# Patient Record
Sex: MALE | Race: ASIAN | NOT HISPANIC OR LATINO
[De-identification: names, ages, dates, MRNs, and addresses within clinical notes are randomized per-mention and may not be internally consistent; named-entity substitution may affect disease eponyms.]

---

## 2018-11-06 ENCOUNTER — RESULT REVIEW (OUTPATIENT)
Age: 48
End: 2018-11-06

## 2021-09-08 PROBLEM — Z00.00 ENCOUNTER FOR PREVENTIVE HEALTH EXAMINATION: Status: ACTIVE | Noted: 2021-09-08

## 2021-11-16 ENCOUNTER — APPOINTMENT (OUTPATIENT)
Dept: NEUROLOGY | Facility: CLINIC | Age: 51
End: 2021-11-16

## 2021-12-07 ENCOUNTER — APPOINTMENT (OUTPATIENT)
Dept: NEUROLOGY | Facility: CLINIC | Age: 51
End: 2021-12-07
Payer: MEDICAID

## 2021-12-07 VITALS
DIASTOLIC BLOOD PRESSURE: 72 MMHG | BODY MASS INDEX: 26.98 KG/M2 | WEIGHT: 158 LBS | HEIGHT: 64 IN | SYSTOLIC BLOOD PRESSURE: 113 MMHG | HEART RATE: 85 BPM

## 2021-12-07 PROCEDURE — 99204 OFFICE O/P NEW MOD 45 MIN: CPT

## 2021-12-07 RX ORDER — FINASTERIDE 5 MG/1
5 TABLET, FILM COATED ORAL
Refills: 0 | Status: ACTIVE | COMMUNITY

## 2021-12-07 RX ORDER — CARBIDOPA AND LEVODOPA 25; 100 MG/1; MG/1
25-100 TABLET ORAL
Refills: 0 | Status: DISCONTINUED | COMMUNITY
End: 2021-12-07

## 2021-12-07 RX ORDER — PANTOPRAZOLE 40 MG/1
40 TABLET, DELAYED RELEASE ORAL
Refills: 0 | Status: ACTIVE | COMMUNITY

## 2021-12-07 NOTE — HISTORY OF PRESENT ILLNESS
[FreeTextEntry1] : Patient is seeing me for evaluation for PD\par \par His initial symptoms started in 2019n with a tremor in his right hand and leg. It was associated with stiffness and slowing of his movements, which have continued to worsen. His right armswing is absent per his wife. He is able to do ADLs independently. He has difficulty taking his wallet out of his pocket and has micrographia as well. He was diagnosed with PD in [facility] where he was started on levodopa. He reports an initial response but more recently has a delayed ON time. \par \par He is  taking sinemet  2 tabs 9-1-5 and 1 tab at 9P. It takes 30minutes for his dose to kick in and it last 4hrs. Afterwards, his off periods are rapid. He has days when he is wearing offs are minimal. During the night, he experiences difficulty turning in bed, but moves well in the morning\par \par MRI brain 7/2021 - (report) normal \par \par Nonmotor:\par severe sleep fragmentation ( 3-4hr/night)\par +constipation - BM every other day\par No cognitive faculties\par \par \par \par Meds\par sinemet \par finasteride 5mg \par protonix

## 2021-12-07 NOTE — PHYSICAL EXAM
[Motor Strength] : muscle strength was normal in all four extremities [2+] : Patella left 2+ [FreeTextEntry1] : Last LD dose 1.5 hr\par \par There is 1+ masking. Speech is 2+. Tremor is absent. There is 1+ R>L bradykinesia with 1+ rigidity on right side. There is no dysmetria. He arises easily and walks with depressed right armswing. SL and turns are normal. Posture is good. Postural reflexes intact

## 2021-12-20 RX ORDER — RASAGILINE 1 MG/1
1 TABLET ORAL
Qty: 30 | Refills: 4 | Status: DISCONTINUED | COMMUNITY
Start: 2021-12-07 | End: 2021-12-20

## 2022-04-26 ENCOUNTER — APPOINTMENT (OUTPATIENT)
Dept: NEUROLOGY | Facility: CLINIC | Age: 52
End: 2022-04-26
Payer: MEDICAID

## 2022-04-26 VITALS
SYSTOLIC BLOOD PRESSURE: 128 MMHG | RESPIRATION RATE: 16 BRPM | WEIGHT: 158 LBS | DIASTOLIC BLOOD PRESSURE: 76 MMHG | HEART RATE: 77 BPM | BODY MASS INDEX: 26.98 KG/M2 | HEIGHT: 64 IN

## 2022-04-26 DIAGNOSIS — R42 DIZZINESS AND GIDDINESS: ICD-10-CM

## 2022-04-26 PROCEDURE — 99214 OFFICE O/P EST MOD 30 MIN: CPT

## 2022-04-26 RX ORDER — SELEGILINE HYDROCHLORIDE 5 MG/1
5 TABLET ORAL
Qty: 180 | Refills: 3 | Status: COMPLETED | COMMUNITY
Start: 2021-12-20 | End: 2022-04-26

## 2022-04-26 NOTE — DISCUSSION/SUMMARY
[FreeTextEntry1] : PD x 4 years with vertiginous dizziness that is likely peripheral in etiology\par \par Patient was counseled on the following recommendations:\par \par change sinemet to 1 tab q3hrs (4 doses/d)\par cont CR  1 tab qhs\par obtain brain MRI to r/o recent vascular insult \par refer to vestibular therapy\par increase melatonin to 5-6mg qhs\par \par \par f/u 3-4 months

## 2022-04-26 NOTE — PHYSICAL EXAM
[FreeTextEntry1] : Last dose 3.5hrs\par Not orthostatic on exam\par \par There is mild masking. Tremor is absent. There is 2+ R>L bradykinesia with mild cogwheeling. Walks with right foot dystonia and depressed armswing. Pull test is negative. Turned to left on fukuda

## 2022-04-26 NOTE — HISTORY OF PRESENT ILLNESS
[FreeTextEntry1] : With initiation of MABOI patient reports he developed lightheadedness that improved following discontinuation. In the past month, he complains of dizziness when standing and walking. He qualifies the feeling as a sensation of moving in space. He does not syncopize and it abates when he sits still. \par He is taking sinemet 1.5 tabs TID and feels that the vertiginious dizziness worsens after each dose. He egst close to 3.5-4hrs of ON time. Wearing off is heralded by numbness and heaviness on his right side.\par \par \par MRI brain 7/2021 - (report) normal \par \par Nonmotor:\par severe sleep fragmentation ( 3-4hr/night) - melatonin 3mg qhs has modest benefits\par +constipation - BM every other day\par No cognitive faculties\par exercising more\par \par \par \par Meds\par sinemet  1.5 TID ( 10-2-6)\par sinemet ER  1 tab qhs\par finasteride 5mg \par protonix

## 2022-05-14 ENCOUNTER — APPOINTMENT (OUTPATIENT)
Dept: MRI IMAGING | Facility: CLINIC | Age: 52
End: 2022-05-14
Payer: MEDICAID

## 2022-05-14 ENCOUNTER — OUTPATIENT (OUTPATIENT)
Dept: OUTPATIENT SERVICES | Facility: HOSPITAL | Age: 52
LOS: 1 days | End: 2022-05-14
Payer: MEDICAID

## 2022-05-14 DIAGNOSIS — R42 DIZZINESS AND GIDDINESS: ICD-10-CM

## 2022-05-14 PROCEDURE — 70551 MRI BRAIN STEM W/O DYE: CPT

## 2022-05-14 PROCEDURE — 70551 MRI BRAIN STEM W/O DYE: CPT | Mod: 26

## 2022-07-26 ENCOUNTER — APPOINTMENT (OUTPATIENT)
Dept: NEUROLOGY | Facility: CLINIC | Age: 52
End: 2022-07-26

## 2022-07-26 VITALS
HEIGHT: 64 IN | DIASTOLIC BLOOD PRESSURE: 70 MMHG | SYSTOLIC BLOOD PRESSURE: 111 MMHG | WEIGHT: 160 LBS | HEART RATE: 79 BPM | BODY MASS INDEX: 27.31 KG/M2

## 2022-07-26 DIAGNOSIS — K59.00 CONSTIPATION, UNSPECIFIED: ICD-10-CM

## 2022-07-26 PROCEDURE — 99214 OFFICE O/P EST MOD 30 MIN: CPT

## 2022-07-26 RX ORDER — POLYETHYLENE GLYCOL 3350 17 G/17G
17 POWDER, FOR SOLUTION ORAL DAILY
Qty: 1 | Refills: 6 | Status: ACTIVE | COMMUNITY
Start: 2022-07-26 | End: 1900-01-01

## 2022-07-26 NOTE — DISCUSSION/SUMMARY
[FreeTextEntry1] : PD x4-5 years with good motoric improvement. Suspect possible delayed LD absorption in the later afternoon. \par Constipation\par \par Patient was counseled on the following recommendations:\par \par Leave LD regimen the same\par start miralax qd-qod \par continue regular exercising\par use antivert 12.5mg TID prn for positional vertigo\par \par f/u 4months

## 2022-07-26 NOTE — HISTORY OF PRESENT ILLNESS
[FreeTextEntry1] : Patient kelley been doing better since his last visit. He slowly raised his sinemet to 2 tabs TID\par Overnight akinesia has improved\par In the late afternoon, his 5PM dose can take 2-3 hrs to kick in. \par Denies any falls\par There are no hand tremors or LID\par Exercises regularly\par vertigo responsive to antivert prn\par \par \par MRI brain 7/2021 - (report) normal \par \par Nonmotor:\par sleep has improved\par +constipation - BM every other day\par No cognitive faculties\par exercising more\par \par Meds\par sinemet  2 tabs 9A, 1p, 5pm\par sinemet ER  1.5 tab qhs\par finasteride 5mg \par protonix \par \par Prior \par selegiline

## 2022-07-26 NOTE — PHYSICAL EXAM
[FreeTextEntry1] : There is mild masking. Tremor is absent. There is 1+ R>L bradykinesia with trace cogwheeling. Gait is normal with slight asymmetric right armswing. pull test negative

## 2022-10-04 ENCOUNTER — APPOINTMENT (OUTPATIENT)
Dept: NEUROLOGY | Facility: CLINIC | Age: 52
End: 2022-10-04

## 2022-10-04 VITALS
BODY MASS INDEX: 27.31 KG/M2 | DIASTOLIC BLOOD PRESSURE: 74 MMHG | HEIGHT: 64 IN | WEIGHT: 160 LBS | HEART RATE: 73 BPM | RESPIRATION RATE: 16 BRPM | SYSTOLIC BLOOD PRESSURE: 110 MMHG

## 2022-10-04 DIAGNOSIS — F51.04 PSYCHOPHYSIOLOGIC INSOMNIA: ICD-10-CM

## 2022-10-04 PROCEDURE — 99214 OFFICE O/P EST MOD 30 MIN: CPT

## 2022-10-04 RX ORDER — CARBIDOPA AND LEVODOPA 25; 100 MG/1; MG/1
25-100 TABLET, EXTENDED RELEASE ORAL
Qty: 135 | Refills: 3 | Status: COMPLETED | COMMUNITY
Start: 2021-12-07 | End: 2022-10-04

## 2022-10-04 RX ORDER — MECLIZINE HYDROCHLORIDE 12.5 MG/1
12.5 TABLET ORAL
Qty: 45 | Refills: 0 | Status: COMPLETED | COMMUNITY
Start: 2022-05-17 | End: 2022-10-04

## 2022-10-04 NOTE — PHYSICAL EXAM
[FreeTextEntry1] : Last LD was 3.5hrs\par \par There is mild masking. Tremor is absent. There is 2+ R>L bradykinesia with trace cogwheeling. Gait is mildl slowly with dystonic posturing of right leg with ea stride. Pull test negative. No FOG or LID

## 2022-10-04 NOTE — HISTORY OF PRESENT ILLNESS
[FreeTextEntry1] : Patient's sister states that his 5pm dose of levodopa does not take effect robustly. he experiences dizziness, tremor and increased slowness.\par The prior doses tend to work reliably. \par sinemet ER was not effective and prefers to take regular sinemet \par Often awakens overnight with headache and restlessness\par \par MRI brain 7/2021 - (report) normal \par \par Nonmotor:\par sleep has worsened and getting 3-4 hrs at night \par +constipation - BM every other day. Uses miralax prn \par No cognitive faculties\par exercises sometimes \par experiences occasional depression\par \par Meds\par sinemet  2 tabs 9A, 1p, 5pm, 1.5 tabs at bedtime\par melatonin 5mg qhs\par finasteride 5mg \par protonix \par \par Prior \par selegiline

## 2022-10-04 NOTE — DISCUSSION/SUMMARY
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

## 2023-01-05 ENCOUNTER — APPOINTMENT (OUTPATIENT)
Dept: NEUROLOGY | Facility: CLINIC | Age: 53
End: 2023-01-05
Payer: MEDICAID

## 2023-01-05 VITALS
HEIGHT: 64 IN | DIASTOLIC BLOOD PRESSURE: 69 MMHG | SYSTOLIC BLOOD PRESSURE: 115 MMHG | WEIGHT: 163 LBS | HEART RATE: 73 BPM | BODY MASS INDEX: 27.83 KG/M2

## 2023-01-05 PROCEDURE — 99214 OFFICE O/P EST MOD 30 MIN: CPT

## 2023-01-05 NOTE — DISCUSSION/SUMMARY
[FreeTextEntry1] : PD x 3 years with varied wearing off and dose failures. He tends to have lots og GI discomfort with gas and abdominal pain at times associated with low appetite. His motor fluctuations are likely in the setting of his GI dysmotility, potentially SIBO (small intestinal bacterial overgrowth). He also takes his medication after he eats which can cause malabsorption with protein. 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 2pills at 7am, 11am, 3pm, and 1.5pills at 7pm.\par - eat 30-60 minutes after taking the medication\par - Trial of neupro 2mg patch to avoid the GI tract\par - If insurance not covered can try pramipexole\par - c/w trazodone 50mg 1/2-1 tab qhs\par - increase exercises during the week\par - start probiotics for GI health/motility. Can consider GI consult for constipation, perhaps linzes?\par - start senna along with already daily miralax \par \par Case discussed and pt examined with movement attending Dr. Jones \par \par Magan Wesley MD\par Movement Fellow

## 2023-01-05 NOTE — HISTORY OF PRESENT ILLNESS
[FreeTextEntry1] : Since last visit in October 2022 he reports still having issues of fluctuations. Sometimes having dose failures. Right side feeling weaker and slower. \par - walking is worse with balance when he feels OFF. no falls. \par - started entacapone and was working well without fluctuations and after 5 days slowly worsened and now his sx's the same as last visit. \par - report's his head hurts after taking the medication with varied times. \par - he rarely feels weak and dizzy and BP checked 90/65. This has occurred so far 3 times. No syncopal episodes.\par - Lots of gas, BM every other day to 2 days. milk of magnesia PRN. uses MiraLAX.. stomach pain at night. low appetite. eats small portions of food including vegetables. \par - memory is ok. talks slow. some apathy \par - Is not physically active because he is not well and concerned about falling.Car driving has become more of an issue. \par \par sleep has worsened and getting 3-4 hrs at night. started trazodone which sometimes helps. \par \par Meds\par sinemet  2 tabs 9A, 1p, 5pm, and 1.5 tabs at 9pm . Eats meals and then takes meds... \par melatonin 5mg qhs PRN \par finasteride 5mg\par protonix\par \par Prior \par selegiline. rasagiline not covered by insurance

## 2023-03-31 ENCOUNTER — APPOINTMENT (OUTPATIENT)
Dept: NEUROLOGY | Facility: CLINIC | Age: 53
End: 2023-03-31
Payer: MEDICAID

## 2023-03-31 VITALS
DIASTOLIC BLOOD PRESSURE: 71 MMHG | HEIGHT: 64 IN | BODY MASS INDEX: 25.95 KG/M2 | SYSTOLIC BLOOD PRESSURE: 109 MMHG | HEART RATE: 72 BPM | WEIGHT: 152 LBS

## 2023-03-31 PROCEDURE — 99214 OFFICE O/P EST MOD 30 MIN: CPT

## 2023-03-31 NOTE — PHYSICAL EXAM
[FreeTextEntry1] : Last LD was 2.5hrs. he feels ON\par \par There is mild masking. Tremor is absent. There is 1+ R>L bradykinesia with trace cogwheeling. Walks with good speed and SL.  Pull test negative. No FOG.LID absent

## 2023-03-31 NOTE — DISCUSSION/SUMMARY
[FreeTextEntry1] : PD x 3 years with varied wearing off and dose failures. He tends to have lots og GI discomfort with gas and abdominal pain at times associated with low appetite. 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

## 2023-03-31 NOTE — HISTORY OF PRESENT ILLNESS
[FreeTextEntry1] : Since last visit in October 2022 he reports still having issues of fluctuations. He reports dose failures at the 11am and bedtime dose. Off times are characterized by weakness in the legs and slow/difficult gait.  Sleep has continued to be a problem. He sleeps 2-4 hrs at night. Started trazodone but as only tried taking half a pill. He reports hypotension and lightheadedness that comes on suddenly. Gait is slow does not use assistive devices. Denies recent falls. \par \par Nonmotor\par -Constipation, BM every 2-3 days, Has some nausea and bloating after meals\par \par  \par \par Meds\par sinemet  2 tabs [id number] \par entacopone 200mg with each dose of LD \par Trazadone 1/2 50mg tablet qhs\par finasteride 5mg\par protonix\par \par Prior \par selegiline. rasagiline not covered by insurance \par neupro- not covered by insurance

## 2023-04-13 RX ORDER — ROTIGOTINE 2 MG/24H
2 PATCH, EXTENDED RELEASE TRANSDERMAL DAILY
Qty: 30 | Refills: 5 | Status: COMPLETED | COMMUNITY
Start: 2023-01-05 | End: 2023-04-13

## 2023-04-13 RX ORDER — ROTIGOTINE 2 MG/24H
2 PATCH, EXTENDED RELEASE TRANSDERMAL DAILY
Qty: 90 | Refills: 0 | Status: COMPLETED | COMMUNITY
Start: 2023-03-31 | End: 2023-04-13

## 2023-07-18 ENCOUNTER — APPOINTMENT (OUTPATIENT)
Dept: NEUROLOGY | Facility: CLINIC | Age: 53
End: 2023-07-18
Payer: MEDICAID

## 2023-07-18 VITALS
BODY MASS INDEX: 25.44 KG/M2 | DIASTOLIC BLOOD PRESSURE: 76 MMHG | HEIGHT: 64 IN | HEART RATE: 78 BPM | WEIGHT: 149 LBS | SYSTOLIC BLOOD PRESSURE: 123 MMHG

## 2023-07-18 PROCEDURE — 99214 OFFICE O/P EST MOD 30 MIN: CPT

## 2023-07-18 RX ORDER — CARBIDOPA AND LEVODOPA 25; 100 MG/1; MG/1
25-100 TABLET, EXTENDED RELEASE ORAL
Qty: 90 | Refills: 3 | Status: COMPLETED | COMMUNITY
Start: 2023-04-13 | End: 2023-07-18

## 2023-07-18 NOTE — PHYSICAL EXAM
[FreeTextEntry1] : \par There is mild masking.  Speech is 2+.  EOMI.  Tremor is absent. There is 1+ R>L bradykinesia with trace cogwheeling. Tremor absent. Has right leg dystonia when walking.  This disappears when he walks backwards.  Pull test negative

## 2023-07-18 NOTE — DISCUSSION/SUMMARY
[FreeTextEntry1] : PD x 4 years with considerable fluctuations. Gait remains stable but has off state leg dystonia and marked overnight akinesia affecting his sleep.  The escalation in LEDD is notable and may warrant consideration for advanced therapy should his fluctuations not improve.\par \par Patient was counseled on the following recommendations:\par  \par -Change Sinemet  to 2 tablets every 3-3 and half hours with entacapone\par -Start Rytary 145 mg 2 to 3 tablets at bedtime\par -Continue Ambien as needed\par -Follow-up in 3 months\par \par

## 2023-07-18 NOTE — HISTORY OF PRESENT ILLNESS
[FreeTextEntry1] : Patient reports that while on mirapex he developed dizziness and weakness. He is no longer taking it and primarily takes sinemet 2 tabs qid. His wife feels that levodopa last 1-2 hrs. In the off state. he gets tremor in RUE and increased slowness. \par In the mornings, he tends to be ok but worsens as the day progresses. \par He has greater difficulty turning in bed\par \par MRI brain 7/2021 - (report) normal \par \par Nonmotor:\par sleep is fragmented. He tried ambien with some benefit. \par +constipation - BM every other day. Uses miralax prn \par No cognitive faculties\par exercises sometimes \par experiences occasional depression\par \par Meds\par sinemet  2 tabs 7A/11A/3p/7p\par entacapone 200mg 1 tab qid\par ambien 10mg qhs prn\par finasteride 5mg \par protonix \par \par Prior \par selegiline\par mirapex\par neupro - not covered

## 2023-08-24 ENCOUNTER — INPATIENT (INPATIENT)
Facility: HOSPITAL | Age: 53
LOS: 0 days | Discharge: HOME | End: 2023-08-25
Attending: STUDENT IN AN ORGANIZED HEALTH CARE EDUCATION/TRAINING PROGRAM | Admitting: STUDENT IN AN ORGANIZED HEALTH CARE EDUCATION/TRAINING PROGRAM
Payer: MEDICAID

## 2023-08-24 VITALS
RESPIRATION RATE: 17 BRPM | SYSTOLIC BLOOD PRESSURE: 106 MMHG | TEMPERATURE: 98 F | OXYGEN SATURATION: 100 % | HEART RATE: 71 BPM | DIASTOLIC BLOOD PRESSURE: 66 MMHG

## 2023-08-24 DIAGNOSIS — R51.9 HEADACHE, UNSPECIFIED: ICD-10-CM

## 2023-08-24 LAB
ALBUMIN SERPL ELPH-MCNC: 4.4 G/DL — SIGNIFICANT CHANGE UP (ref 3.3–5)
ALP SERPL-CCNC: 72 U/L — SIGNIFICANT CHANGE UP (ref 40–120)
ALT FLD-CCNC: 6 U/L — SIGNIFICANT CHANGE UP (ref 4–41)
ANION GAP SERPL CALC-SCNC: 12 MMOL/L — SIGNIFICANT CHANGE UP (ref 7–14)
AST SERPL-CCNC: 19 U/L — SIGNIFICANT CHANGE UP (ref 4–40)
BILIRUB SERPL-MCNC: 0.3 MG/DL — SIGNIFICANT CHANGE UP (ref 0.2–1.2)
BUN SERPL-MCNC: 9 MG/DL — SIGNIFICANT CHANGE UP (ref 7–23)
CALCIUM SERPL-MCNC: 9.2 MG/DL — SIGNIFICANT CHANGE UP (ref 8.4–10.5)
CHLORIDE SERPL-SCNC: 98 MMOL/L — SIGNIFICANT CHANGE UP (ref 98–107)
CO2 SERPL-SCNC: 25 MMOL/L — SIGNIFICANT CHANGE UP (ref 22–31)
CREAT SERPL-MCNC: 0.88 MG/DL — SIGNIFICANT CHANGE UP (ref 0.5–1.3)
EGFR: 105 ML/MIN/1.73M2 — SIGNIFICANT CHANGE UP
GLUCOSE SERPL-MCNC: 135 MG/DL — HIGH (ref 70–99)
HCT VFR BLD CALC: 40.5 % — SIGNIFICANT CHANGE UP (ref 39–50)
HGB BLD-MCNC: 13.8 G/DL — SIGNIFICANT CHANGE UP (ref 13–17)
MCHC RBC-ENTMCNC: 29.7 PG — SIGNIFICANT CHANGE UP (ref 27–34)
MCHC RBC-ENTMCNC: 34.1 GM/DL — SIGNIFICANT CHANGE UP (ref 32–36)
MCV RBC AUTO: 87.1 FL — SIGNIFICANT CHANGE UP (ref 80–100)
NRBC # BLD: 0 /100 WBCS — SIGNIFICANT CHANGE UP (ref 0–0)
NRBC # FLD: 0 K/UL — SIGNIFICANT CHANGE UP (ref 0–0)
PLATELET # BLD AUTO: 196 K/UL — SIGNIFICANT CHANGE UP (ref 150–400)
POTASSIUM SERPL-MCNC: 3.6 MMOL/L — SIGNIFICANT CHANGE UP (ref 3.5–5.3)
POTASSIUM SERPL-SCNC: 3.6 MMOL/L — SIGNIFICANT CHANGE UP (ref 3.5–5.3)
PROT SERPL-MCNC: 8 G/DL — SIGNIFICANT CHANGE UP (ref 6–8.3)
RBC # BLD: 4.65 M/UL — SIGNIFICANT CHANGE UP (ref 4.2–5.8)
RBC # FLD: 12.4 % — SIGNIFICANT CHANGE UP (ref 10.3–14.5)
SODIUM SERPL-SCNC: 135 MMOL/L — SIGNIFICANT CHANGE UP (ref 135–145)
TROPONIN T, HIGH SENSITIVITY RESULT: 7 NG/L — SIGNIFICANT CHANGE UP
TROPONIN T, HIGH SENSITIVITY RESULT: 7 NG/L — SIGNIFICANT CHANGE UP
WBC # BLD: 6.73 K/UL — SIGNIFICANT CHANGE UP (ref 3.8–10.5)
WBC # FLD AUTO: 6.73 K/UL — SIGNIFICANT CHANGE UP (ref 3.8–10.5)

## 2023-08-24 PROCEDURE — 99285 EMERGENCY DEPT VISIT HI MDM: CPT

## 2023-08-24 PROCEDURE — 70496 CT ANGIOGRAPHY HEAD: CPT | Mod: 26,MA

## 2023-08-24 PROCEDURE — 70498 CT ANGIOGRAPHY NECK: CPT | Mod: 26,MA

## 2023-08-24 PROCEDURE — 99223 1ST HOSP IP/OBS HIGH 75: CPT

## 2023-08-24 RX ORDER — ONDANSETRON 8 MG/1
4 TABLET, FILM COATED ORAL ONCE
Refills: 0 | Status: COMPLETED | OUTPATIENT
Start: 2023-08-24 | End: 2023-08-24

## 2023-08-24 RX ORDER — ACETAMINOPHEN 500 MG
975 TABLET ORAL ONCE
Refills: 0 | Status: COMPLETED | OUTPATIENT
Start: 2023-08-24 | End: 2023-08-24

## 2023-08-24 RX ORDER — ZOLPIDEM TARTRATE 10 MG/1
5 TABLET ORAL AT BEDTIME
Refills: 0 | Status: DISCONTINUED | OUTPATIENT
Start: 2023-08-24 | End: 2023-08-25

## 2023-08-24 RX ORDER — LATANOPROST 0.05 MG/ML
1 SOLUTION/ DROPS OPHTHALMIC; TOPICAL AT BEDTIME
Refills: 0 | Status: DISCONTINUED | OUTPATIENT
Start: 2023-08-24 | End: 2023-08-25

## 2023-08-24 RX ADMIN — ONDANSETRON 4 MILLIGRAM(S): 8 TABLET, FILM COATED ORAL at 19:26

## 2023-08-24 NOTE — H&P ADULT - NSHPLABSRESULTS_GEN_ALL_CORE
EKG personally reviewed.  NSR at 73 bpm, no acute ischemic changes, QTc 418 ms.    Imaging personally reviewed.                        13.8   6.73  )-----------( 196      ( 24 Aug 2023 15:45 )             40.5     08-24    135  |  98  |  9   ----------------------------<  135<H>  3.6   |  25  |  0.88    Ca    9.2      24 Aug 2023 15:45    TPro  8.0  /  Alb  4.4  /  TBili  0.3  /  DBili  x   /  AST  19  /  ALT  6   /  AlkPhos  72  08-24    Hs-trops 7 -> 7    Imaging personally reviewed.  ACC: [id number] EXAM:  CT ANGIO NECK (W)AW IC   ORDERED BY: AMY BOND   ACC: [id number] EXAM:  CT ANGIO BRAIN (W)AW IC   ORDERED BY: AMY BOND   PROCEDURE DATE:  08/24/2023      INTERPRETATION:  CLINICAL INFORMATION: Pounding headache. Evaluate for aneurysm, subarachnoid hemorrhage.    TECHNIQUE: Noncontrast axial CT images were acquired through the head.   Two-dimensional sagittal and coronal reformats were generated.  Contrast enhanced CT angiography of the neck and head was performed.  MIP   reformats were generated in the coronal, sagittal and axial planes.  Intravenous contrast: 90 cc Omnipaque 350 were administered; 10 cc were   discarded.    COMPARISON: MR brain 5/14/2022    FINDINGS:  CT HEAD:  No acute intracranial hemorrhage, midline shift, or mass effect. No   hydrocephalus. The basal cisterns are patent.    Mucosal thickening within the ethmoid air cells. The mastoid air cells   are clear. The globes are unremarkable.    The soft tissues of the scalp are unremarkable. The calvarium is intact.    CT ANGIOGRAPHY NECK:  Three-vessel aortic arch. The origins of the great vessels are   unremarkable. The common carotid arteries are normal in caliber without   significant stenosis.    The carotid bifurcations are unremarkable. The internal carotid arteries   are normal in caliber without significant stenosis.    Co-dominant vertebral arteries. The vertebral arteries are normal in   caliber without significant stenosis.    Multinodular thyroid gland. Visualized osseous structures are   unremarkable.    CT ANGIOGRAPHY BRAIN:    Anterior circulation: The bilateral anterior cerebral and middle cerebral   arteries are patent without evidence of significant stenosis, major   vessel occlusion, or aneurysm.    Posterior circulation: The distal vertebral arteries, basilar artery, and   bilateral posterior cerebral arteries are patent without evidence of   significant stenosis, major vessel occlusion, or aneurysm.    No enlarged vascular lesions or clusters ofabnormal vessels are noted to   suggest an arterial venous malformation.    Visualized portions of the superficial and deep venous systems are   unremarkable.      IMPRESSION:  CT head:  -No acute intracranial hemorrhage, mass effect, or midline shift.    CT angiography neck:  -No vaso-occlusive disease or dissection.    CT angiography brain:  -No major vessel occlusion or proximal stenosis. No evidence of aneurysm or other vascular malformation. EKG personally reviewed.  NSR at 73 bpm, no acute ischemic changes, QTc 418 ms.    Imaging personally reviewed.                        13.8   6.73  )-----------( 196      ( 24 Aug 2023 15:45 )             40.5     08-24    135  |  98  |  9   ----------------------------<  135<H>  3.6   |  25  |  0.88    Ca    9.2      24 Aug 2023 15:45    TPro  8.0  /  Alb  4.4  /  TBili  0.3  /  DBili  x   /  AST  19  /  ALT  6   /  AlkPhos  72  08-24    Hs-trops 7 -> 7    Imaging personally reviewed.  ACC: [id number] EXAM:  CT ANGIO NECK (W)AW IC   ORDERED BY: AMY BOND   ACC: [id number] EXAM:  CT ANGIO BRAIN (W)AW IC   ORDERED BY: AMY BOND   PROCEDURE DATE:  08/24/2023      INTERPRETATION:  CLINICAL INFORMATION: Pounding headache. Evaluate for aneurysm, subarachnoid hemorrhage.    TECHNIQUE: Noncontrast axial CT images were acquired through the head.   Two-dimensional sagittal and coronal reformats were generated.  Contrast enhanced CT angiography of the neck and head was performed.  MIP   reformats were generated in the coronal, sagittal and axial planes.  Intravenous contrast: 90 cc Omnipaque 350 were administered; 10 cc were   discarded.    COMPARISON: MR brain 5/14/2022    FINDINGS:  CT HEAD:  No acute intracranial hemorrhage, midline shift, or mass effect. No   hydrocephalus. The basal cisterns are patent.    Mucosal thickening within the ethmoid air cells. The mastoid air cells   are clear. The globes are unremarkable.    The soft tissues of the scalp are unremarkable. The calvarium is intact.    CT ANGIOGRAPHY NECK:  Three-vessel aortic arch. The origins of the great vessels are   unremarkable. The common carotid arteries are normal in caliber without   significant stenosis.    The carotid bifurcations are unremarkable. The internal carotid arteries   are normal in caliber without significant stenosis.    Co-dominant vertebral arteries. The vertebral arteries are normal in   caliber without significant stenosis.    Multinodular thyroid gland. Visualized osseous structures are   unremarkable.    CT ANGIOGRAPHY BRAIN:    Anterior circulation: The bilateral anterior cerebral and middle cerebral   arteries are patent without evidence of significant stenosis, major   vessel occlusion, or aneurysm.    Posterior circulation: The distal vertebral arteries, basilar artery, and   bilateral posterior cerebral arteries are patent without evidence of   significant stenosis, major vessel occlusion, or aneurysm.    No enlarged vascular lesions or clusters of abnormal vessels are noted to   suggest an arterial venous malformation.    Visualized portions of the superficial and deep venous systems are   unremarkable.      IMPRESSION:  CT head:  -No acute intracranial hemorrhage, mass effect, or midline shift.    CT angiography neck:  -No vaso-occlusive disease or dissection.    CT angiography brain:  -No major vessel occlusion or proximal stenosis. No evidence of aneurysm or other vascular malformation.

## 2023-08-24 NOTE — CONSULT NOTE ADULT - SUBJECTIVE AND OBJECTIVE BOX
HPI:    (Stroke only)  LKN:  NIHSS:   preMRS:   Pt is not a candidate for tenecteplase due to [outside tenecteplase window / mild, non-disabling deficit]  Pt is not a candidate for mechanical thrombectomy due to no large vessel occlusion on CTA    REVIEW OF SYSTEMS  General:	  Skin/Breast:	  Ophthalmologic:  ENMT:	  Respiratory and Thorax:	  Cardiovascular:	  Gastrointestinal:	  Genitourinary:	  Musculoskeletal:	  Neurological:	  Psychiatric:	  Hematology/Lymphatics:	  Endocrine:	  Allergic/Immunologic:	    A 10-system ROS was performed and is negative except for those items noted above and/or in the HPI.    PAST MEDICAL & SURGICAL HISTORY:  No pertinent past medical history      Parkinsons      No significant past surgical history        FAMILY HISTORY:  Family history of diabetes mellitus type II (Father)      SOCIAL HISTORY:   T/E/D:   Occupation:   Lives with:     MEDICATIONS (HOME):  Home Medications:    MEDICATIONS  (STANDING):    MEDICATIONS  (PRN):    ALLERGIES/INTOLERANCES:  Allergies  No Known Allergies    Intolerances    VITALS & EXAMINATION:  Vital Signs Last 24 Hrs  T(C): 36.6 (24 Aug 2023 18:27), Max: 36.7 (24 Aug 2023 14:08)  T(F): 97.8 (24 Aug 2023 18:27), Max: 98.1 (24 Aug 2023 14:08)  HR: 67 (24 Aug 2023 18:27) (67 - 71)  BP: 120/74 (24 Aug 2023 18:27) (106/66 - 120/74)  BP(mean): --  RR: 17 (24 Aug 2023 18:27) (17 - 17)  SpO2: 100% (24 Aug 2023 18:27) (100% - 100%)    Parameters below as of 24 Aug 2023 18:27  Patient On (Oxygen Delivery Method): room air      General:  Constitutional: Obese Male, appears stated age, in no apparent distress including pain  Head: Normocephalic & atraumatic.  ENT: Patent ear canals, intact TM, mucus membranes moist & pink, neck supple, no lymphadenopathy.   Respiratory: Patent airway. All lung fields are clear to auscultation bilaterally.  Extremities: No cyanosis, clubbing, or edema.  Skin: No rashes, bruising, or discoloration.    Cardiovascular (>2): RRR no murmurs. Carotid pulsations symmetric, no bruits. Normal capillary beds refill, 1-2 seconds or less.     Neurological (>12):  MS: Awake, alert, oriented to person, place, situation, time. Normal affect. Follows all commands.    Language: Speech is clear, fluent with good repetition & comprehension (able to name objects___)    CNs: PERRLA (R = 3mm, L = 3mm). VFF. EOMI no nystagmus, no diplopia. V1-3 intact to LT/pinprick, well developed masseter muscles b/l. No facial asymmetry b/l, full eye closure strength b/l. Hearing grossly normal (rubbing fingers) b/l. Symmetric palate elevation in midline. Gag reflex deferred. Head turning & shoulder shrug intact b/l. Tongue midline, normal movements, no atrophy.    Fundoscopic: pale w/ sharp discs margins No vascular changes.      Motor: Normal muscle bulk & tone. No noticeable tremor or seizure. No pronator drift.              Deltoid	Biceps	Triceps	Wrist	Finger ABd	   R	5	5	5	5	5		5 	  L	5	5	5	5	5		5    	H-Flex	H-Ext	H-ABd	H-ADd	K-Flex	K-Ext	D-Flex	P-Flex  R	5	5	5	5	5	5	5	5 	   L	5	5	5	5	5	5	5	5	     Sensation: Intact to LT/PP/Temp/Vibration/Position b/l throughout.     Cortical: Extinction on DSS (neglect): none    Reflexes:              Biceps(C5)       BR(C6)     Triceps(C7)               Patellar(L4)    Achilles(S1)    Plantar Resp  R	2	          2	             2		        2		    2		Down   L	2	          2	             2		        2		    2		Down     Coordination: intact rapid-alt movements. No dysmetria to FTN/HTS    Gait: Normal Romberg. No postural instability. Normal stance and tandem gait.     LABORATORY:  CBC                       13.8   6.73  )-----------( 196      ( 24 Aug 2023 15:45 )             40.5     Chem 08-24    135  |  98  |  9   ----------------------------<  135<H>  3.6   |  25  |  0.88    Ca    9.2      24 Aug 2023 15:45    TPro  8.0  /  Alb  4.4  /  TBili  0.3  /  DBili  x   /  AST  19  /  ALT  6   /  AlkPhos  72  08-24    LFTs LIVER FUNCTIONS - ( 24 Aug 2023 15:45 )  Alb: 4.4 g/dL / Pro: 8.0 g/dL / ALK PHOS: 72 U/L / ALT: 6 U/L / AST: 19 U/L / GGT: x           U/A Urinalysis Basic - ( 24 Aug 2023 15:45 )    Color: x / Appearance: x / SG: x / pH: x  Gluc: 135 mg/dL / Ketone: x  / Bili: x / Urobili: x   Blood: x / Protein: x / Nitrite: x   Leuk Esterase: x / RBC: x / WBC x   Sq Epi: x / Non Sq Epi: x / Bacteria: x    STUDIES & IMAGING:  < from: CT Angio Head w/ IV Cont (08.24.23 @ 17:17) >  IMPRESSION:  CT head:  -No acute intracranial hemorrhage, mass effect, or midline shift.    CT angiography neck:  -No vaso-occlusive disease or dissection.    CT angiography brain:  -No major vessel occlusion or proximal stenosis. No evidence of aneurysm   or other vascular malformation.    < end of copied text >       HPI:  52M  Donn speaking with Parkinson's presents to ED c/o R facial numbness 2 weeks. Pt's wife at bedside. Pt describes intermittent numbness in the lower part of his R face, no paresthesias or facial heaviness, no numbness/tingling/weakness in his RUE or RLE, more constant in the 2 days. Of note, pt reports an intermittent headache, also started around 2 weeks ago, described as holocephalic, "pounding", gradual onset, intermittent lasting hours, not positional, no vision changes. Pt with worsening insomnia. Denies fevers, recent illness, vision changes, eye pain, chest pain, difficulty breathing, falls, head trauma, change in gait.    For pt's Parkinson's disease, dx in 2019, follows with Dr. Clovis Jones. Pt with considerable fluctuations. Gait is stable but has off state RLE dystonia and marked overnight akinesia affecting his sleep. Pt currently on Sinemet  2 tablets every 3-3 and half hours (5x/day) with entacapone, recently increased in mid 7/2023 from 4x/day. Pt reports worsening generalized body shaking (dyskinesias) and orthostatic symptoms (lightheadedness with position changes) after he takes the sinemet, and overall feels worse with the increased medications. Pt was also prescribed Rytary 145 mg 2 to 3 tablets at bedtime, however pt has not started this medication yet.     REVIEW OF SYSTEMS    A 10-system ROS was performed and is negative except for those items noted above and/or in the HPI.    PAST MEDICAL & SURGICAL HISTORY:  No pertinent past medical history      Parkinsons      No significant past surgical history        FAMILY HISTORY:  Family history of diabetes mellitus type II (Father)      SOCIAL HISTORY:   Lives with: wife    MEDICATIONS (HOME):  Home Medications: Sinemet  2 tablets 5x/day (7am, 10am, 2pm, 5pm, 9pm) with entacapone    MEDICATIONS  (STANDING):    MEDICATIONS  (PRN):    ALLERGIES/INTOLERANCES:  Allergies  No Known Allergies    Intolerances    VITALS & EXAMINATION:  Vital Signs Last 24 Hrs  T(C): 36.6 (24 Aug 2023 18:27), Max: 36.7 (24 Aug 2023 14:08)  T(F): 97.8 (24 Aug 2023 18:27), Max: 98.1 (24 Aug 2023 14:08)  HR: 67 (24 Aug 2023 18:27) (67 - 71)  BP: 120/74 (24 Aug 2023 18:27) (106/66 - 120/74)  BP(mean): --  RR: 17 (24 Aug 2023 18:27) (17 - 17)  SpO2: 100% (24 Aug 2023 18:27) (100% - 100%)    Parameters below as of 24 Aug 2023 18:27  Patient On (Oxygen Delivery Method): room air      General:  Constitutional: Male, appears stated age, in no apparent distress including pain  Head: Normocephalic & atraumatic.  Respiratory: No increased work of breathing at rest  Extremities: No cyanosis, clubbing, or edema.  Skin: No rashes, bruising, or discoloration.    Neurological (>12):  MS: Awake, alert, oriented to person, place, situation, time. Masked facies. Follows all commands.    Language: Speech is clear, fluent with good repetition & comprehension (able to name objects mask, thumb). Bradyphrenia    CNs: PERRL (R = 3mm, L = 3mm). VFF. EOMI no nystagmus, no diplopia. V2-3 decreased on R to LT/pinprick, well developed masseter muscles b/l. No facial asymmetry b/l, full eye closure strength b/l. Hearing slightly decreased on L (rubbing fingers) which pt reports is chronic. Symmetric palate elevation in midline. Gag reflex deferred. Head turning & shoulder shrug intact b/l. Tongue midline, normal movements, no atrophy.    Motor: Normal muscle bulk, slightly increased tone in LLE, trace cogwheeling. No noticeable tremor or seizure. No pronator drift.              Deltoid	Biceps	Triceps	Wrist	Finger ABd	   R	5	5	5	5	5		5 	  L	5	5	5	5	5		5    	H-Flex	K-Flex	K-Ext	D-Flex	P-Flex  R	5	5	5	5	5	  L	5	5	5	5	5	     Sensation: Intact to LT/PP b/l throughout.     Cortical: Extinction on DSS (neglect): none    Reflexes:              Biceps(C5)       BR(C6)     Triceps(C7)               Patellar(L4)    Achilles(S1)    Plantar Resp  R	1	          1	             		        1		     		   L	1	          1             		        1		     		     Coordination: intact rapid-alt movements. No dysmetria to FTN     Gait: Normal Romberg. No postural instability. Decreased L arm swing. End bloc turning. No freezing gait    LABORATORY:  CBC                       13.8   6.73  )-----------( 196      ( 24 Aug 2023 15:45 )             40.5     Chem 08-24    135  |  98  |  9   ----------------------------<  135<H>  3.6   |  25  |  0.88    Ca    9.2      24 Aug 2023 15:45    TPro  8.0  /  Alb  4.4  /  TBili  0.3  /  DBili  x   /  AST  19  /  ALT  6   /  AlkPhos  72  08-24    LFTs LIVER FUNCTIONS - ( 24 Aug 2023 15:45 )  Alb: 4.4 g/dL / Pro: 8.0 g/dL / ALK PHOS: 72 U/L / ALT: 6 U/L / AST: 19 U/L / GGT: x           U/A Urinalysis Basic - ( 24 Aug 2023 15:45 )    Color: x / Appearance: x / SG: x / pH: x  Gluc: 135 mg/dL / Ketone: x  / Bili: x / Urobili: x   Blood: x / Protein: x / Nitrite: x   Leuk Esterase: x / RBC: x / WBC x   Sq Epi: x / Non Sq Epi: x / Bacteria: x    STUDIES & IMAGING:  < from: CT Angio Head w/ IV Cont (08.24.23 @ 17:17) >  IMPRESSION:  CT head:  -No acute intracranial hemorrhage, mass effect, or midline shift.    CT angiography neck:  -No vaso-occlusive disease or dissection.    CT angiography brain:  -No major vessel occlusion or proximal stenosis. No evidence of aneurysm   or other vascular malformation.    < end of copied text >

## 2023-08-24 NOTE — PATIENT PROFILE ADULT - FALL HARM RISK - HARM RISK INTERVENTIONS
Assistance with ambulation/Assistance OOB with selected safe patient handling equipment/Communicate Risk of Fall with Harm to all staff/Monitor gait and stability/Reinforce activity limits and safety measures with patient and family/Sit up slowly, dangle for a short time, stand at bedside before walking/Tailored Fall Risk Interventions/Visual Cue: Yellow wristband and red socks/Bed in lowest position, wheels locked, appropriate side rails in place/Call bell, personal items and telephone in reach/Instruct patient to call for assistance before getting out of bed or chair/Non-slip footwear when patient is out of bed/[place] to call system/Physically safe environment - no spills, clutter or unnecessary equipment/Purposeful Proactive Rounding/Room/bathroom lighting operational, light cord in reach

## 2023-08-24 NOTE — ED ADULT NURSE REASSESSMENT NOTE - NS ED NURSE REASSESS COMMENT FT1
Pt receieved at change of shift. Pt alert and oriented x 4. Pt laying in bed, NAD, NSR on the monitor, respirations even and unlabored. VSS in flow sheet. Report given to Mamie CHUNG, awaiting transport.
break coverage RN: pt A&ox4, denies chest pain and SOB. no complaints of pain. denies headache, dizziness ,lightheadedness, radiating chest pain. breathing is spontaneous and unlabored. NSR on continuos cardiac and pulse ox monitoring in place. bed in lowest position, call bell within reach, siderails up x2. pt awaiting CT results.

## 2023-08-24 NOTE — CONSULT NOTE ADULT - ATTENDING COMMENTS
Exam:    CN V: Decreased PP in right V1; cold symmetric in V1,V2; V3 testing of cold limited by beard.     Mild Bradykinesia  Hypomimia   Mild Hypophonia   Fist open-close; Hand pronate/supinate;  Finger taps:  all with minimal slowing  Fist-open with tapping on thigh: slow, irregular.  Tone:  No Lead-pipe rigidity  Cogwheel rigidity in arms with reinforcement only.    No Tremor at rest  No Tremor with action and posture: Right < > left  Dyskinesias in the right arm only when walking.   Muscle Strength: arms and legs, proximal and distal flexors and extensors are normal     Coordination   Finger to nose: Normal.    Heel to shin: Normal.       Gait   Minimal Turning en bloc  Slight flexed posture.     A/P  Mr. Canales is a 53 yo man with a h/o idiopathic Parkinson disease with numbness in the face with fluctuating sensory exam in the face.    I agree with work up and management as above.   Thank you. Exam:    CN V: Decreased PP in right V1; cold symmetric in V1,V2; V3 testing of cold limited by beard.     Mild Bradykinesia  Hypomimia   Mild Hypophonia   Fist open-close; Hand pronate/supinate;  Finger taps:  all with minimal slowing  Fist-open with tapping on thigh: slow, irregular.  Tone:  No Lead-pipe rigidity  Cogwheel rigidity in arms with reinforcement only.    No Tremor at rest  No Tremor with action and posture: Right < > left  Dyskinesias in the right arm only when walking.   Muscle Strength: arms and legs, proximal and distal flexors and extensors are normal     Coordination   Finger to nose: Normal.    Heel to shin: Normal.       Gait   Minimal Turning en bloc  Slight flexed posture.     A/P  Mr. Canales is a 51 yo man with a h/o idiopathic Parkinson disease with numbness in the face with fluctuating sensory exam in the face.    I agree with work up and management as above.   OK to D/C home with f/u with his neurologist.   Thank you.

## 2023-08-24 NOTE — H&P ADULT - ASSESSMENT
51 yo man, mainly Ukrainian speaking, with history of Parkinson's disease, HLD, GERD, glaucoma, and constipation presents with 2 weeks of right-sided facial numbness and diffuse headache, both becoming worse Wednesday morning.

## 2023-08-24 NOTE — ED ADULT TRIAGE NOTE - CHIEF COMPLAINT QUOTE
pt c/o right sided chest pain with shortness of breath beginning this morning. also c/o headache x 7 days and right cheek numbness x 2 weeks, increasing today. states having difficulty walking and speaking x 1 month. no numbness/tingling/ weakness to b/l arms and legs. hx. Parkinson's   MD Adhikari called for stroke evaluation- no code stroke called

## 2023-08-24 NOTE — H&P ADULT - PROBLEM SELECTOR PLAN 3
Pt with periodic, achy right-sided chest pain that has been ongoing for months. According to pt's wife, chest pain was more frequent on Wednesday and recurred on Thursday morning, now fully resolved. No associated shortness of breath, palpitations, or LE pain/swelling. Pt also without any tachycardia, tachypnea, or hypoxia. Low suspicion for PE. Wells' score 0. Hs-trops 7 -> 7. EKG without any acute ischemic changes. No reproducible chest pain on exam. Low suspicion for ACS or even acute pericarditis at this time.  - Check D-dimer  - Monitor for recurrence of chest pain and obtain serial EKGs and cardiac enzymes if having active chest pain  - Possible TTE and Cardiology evaluation either inpatient vs. outpatient if chest pain recurs  - PO Tylenol PRN for pain control

## 2023-08-24 NOTE — ED PROVIDER NOTE - PROGRESS NOTE DETAILS
CTA head and neck unremarkable. Consulted neurology given that patient is with new deficit as of 2 weeks ago. Neuro recommending admission for MRI, patient is not a CDU candidate 2/2 concern for off balance with ambulation.

## 2023-08-24 NOTE — H&P ADULT - NSHPLANGTRANSLATORFT_GEN_A_CORE
Pt declined free translation services and requested that his wife, Sammie, who was present at bedside, provide translation

## 2023-08-24 NOTE — H&P ADULT - PROBLEM SELECTOR PLAN 4
Pt was diagnosed with Parkinson's in 2019. On Sinemet and entacapone, but possibly with recent progression of disease.  - As per Neurology, decrease home Sinemet 25 mg-100 mg 2 tablets 5x/day (7am, 10am, 2pm, 6pm, 9pm) with entacapone 200 mg 1 tablet 5x/day to Sinemet 25 mg-100 mg 1.5 tablets 5x/day (7am, 10am, 2pm, 6pm, 9pm) with entacapone 200 mg 1 tablet 5x/day  - Dysphagia screen passed. S&S evaluation ordered.  - Start soft and bite sized diet for now  - PT/OT consults  - Aspiration precautions and fall risk protocol  - Neurology consulted on admission, appreciate recs. Outpatient Neurology f/u with Dr. Clovis Jones at [address] [place] [address], pt's neurologist.

## 2023-08-24 NOTE — H&P ADULT - NSHPSOCIALHISTORY_GEN_ALL_CORE
Oracio Tirado should be transferred out to [facility] and has been medically cleared 
Denies any alcohol, tobacco, or illicit drug use.  Ambulates without assistance but only for 10-15 minutes at a time.  . Lives with wife.

## 2023-08-24 NOTE — H&P ADULT - NSHPREVIEWOFSYSTEMS_GEN_ALL_CORE
Constitutional: No generalized weakness, fevers, chills, or weight loss  Eyes: No visual changes, double vision, or eye pain  Ears, Nose, Mouth, Throat: No runny nose, sinus pain, ear pain, tinnitus, sore throat, dysphagia, or odynophagia  Cardiovascular: No chest pain, palpitations, or LE edema  Respiratory: No cough, wheezing, hemoptysis, or shortness of breath  Gastrointestinal: No abdominal pain, dysphagia, anorexia, nausea/vomiting, diarrhea/constipation, hematemesis, melena, or BRBPR  Genitourinary: No dysuria, frequency, urgency, or hematuria  Musculoskeletal: No neck pain or back pain. No joint pain, swelling, or decreased ROM.  Skin: No pruritus, rashes, lesions, or wounds  Neurologic: No syncope, seizures, headache, paresthesias, numbness, or limb weakness  Psychiatric: No depression, anxiety, difficulty concentrating, anhedonia, or lack of energy  Endocrine: No heat/cold intolerance, mood swings, sweats, polydipsia, or polyuria  Hematologic/lymphatic: No purpura, petechia, or prolonged or excessive bleeding after dental extraction / injury  Allergic/Immunologic: No anaphylaxis or allergic response to materials, foods, animals    Positives and pertinent negatives noted and all other systems negative. Constitutional: +Right-sided weakness. No fevers, chills, or weight loss.   Eyes: No visual changes, double vision, or eye pain  Ears, Nose, Mouth, Throat: No runny nose, sinus pain, ear pain, tinnitus, sore throat, dysphagia, or odynophagia  Cardiovascular: +Intermittent right-sided chest pain (currently chest pain free). No palpitations or LE edema.   Respiratory: No cough, wheezing, hemoptysis, or shortness of breath  Gastrointestinal: +Chronic constipation. No abdominal pain, nausea/vomiting, diarrhea, hematemesis, melena, or BRBPR.  Genitourinary: No dysuria, frequency, urgency, or hematuria  Musculoskeletal: No neck pain or back pain. No joint pain, swelling, or decreased ROM.  Skin: No pruritus, rashes, lesions, or wounds  Neurologic: +Right-sided facial numbness, diffuse headache, body shaking/tremors, lightheadedness with positional changes. No syncope, seizures, paresthesias, or numbness.  Psychiatric: +Fragmented sleep. No depression, anxiety, difficulty concentrating, anhedonia, or lack of energy.  Endocrine: No heat/cold intolerance, mood swings, sweats, polydipsia, or polyuria  Hematologic/lymphatic: No purpura, petechia, or prolonged or excessive bleeding after dental extraction / injury  Allergic/Immunologic: No anaphylaxis or allergic response to materials, foods, animals    Positives and pertinent negatives noted and all other systems negative.

## 2023-08-24 NOTE — H&P ADULT - NSHPPHYSICALEXAM_GEN_ALL_CORE
Vital Signs Last 24 Hrs  T(C): 36.8 (25 Aug 2023 01:00), Max: 36.9 (24 Aug 2023 22:40)  T(F): 98.3 (25 Aug 2023 01:00), Max: 98.4 (24 Aug 2023 22:40)  HR: 68 (25 Aug 2023 01:00) (64 - 71)  BP: 126/83 (25 Aug 2023 01:00) (106/66 - 126/83)  BP(mean): --  RR: 16 (25 Aug 2023 01:00) (16 - 18)  SpO2: 98% (25 Aug 2023 01:00) (98% - 100%)    PHYSICAL EXAM:  General: Awake and alert.  No acute distress.  Head: Normocephalic, atraumatic.    Eyes: PERRL.  EOMI.  No scleral icterus.  No conjunctival pallor.  Mouth: Moist MM.  No oropharyngeal exudates.    Neck: Supple.  Full range of motion.  No JVD.  No LAD.  No thyromegaly.  Trachea midline.    Heart: RRR.  Normal S1 and S2.  No murmurs, rubs, or gallops.  No LE edema b/l.   Lungs: Nonlabored breathing.  Good inspiratory effort.  CTAB.  No wheezes, crackles, or rhonchi.    Abdomen: BS+, soft, nontender with no rebound or guarding, nondistended.  No hepatomegaly.   Skin: Warm and dry.  No rashes.  Extremities: No cyanosis.  2+ peripheral pulses b/l.  Musculoskeletal: No joint deformities.  No spinal or paraspinal tenderness.  Mild cogwheel rigidity in UE b/l.   Neuro: A&Ox3.  CN II-XII intact, intact facial sensation b/l, no facial droop.  5/5 motor strength in UE and LE b/l.  Tactile sensation intact in UE and LE b/l.  No pronator drift b/l.  No focal deficits.  Psychiatric: Normal mood, slight masked facies. Vital Signs Last 24 Hrs  T(C): 36.8 (25 Aug 2023 01:00), Max: 36.9 (24 Aug 2023 22:40)  T(F): 98.3 (25 Aug 2023 01:00), Max: 98.4 (24 Aug 2023 22:40)  HR: 68 (25 Aug 2023 01:00) (64 - 71)  BP: 126/83 (25 Aug 2023 01:00) (106/66 - 126/83)  BP(mean): --  RR: 16 (25 Aug 2023 01:00) (16 - 18)  SpO2: 98% (25 Aug 2023 01:00) (98% - 100%)    PHYSICAL EXAM:  General: Awake and alert.  No acute distress.  Head: Normocephalic, atraumatic.    Eyes: PERRL.  EOMI.  No scleral icterus.  No conjunctival pallor.  Mouth: Moist MM.  No oropharyngeal exudates.    Neck: Supple.  Full range of motion.  No JVD.  No LAD.  No thyromegaly.  Trachea midline.    Heart: No reproducible chest pain.  RRR.  Normal S1 and S2.  No murmurs, rubs, or gallops.  No LE edema b/l.   Lungs: Nonlabored breathing.  Good inspiratory effort.  CTAB.  No wheezes, crackles, or rhonchi.    Abdomen: BS+, soft, nontender with no rebound or guarding, nondistended.  No hepatomegaly.   Skin: Warm and dry.  No rashes.  Extremities: No cyanosis.  2+ peripheral pulses b/l.  Musculoskeletal: No joint deformities.  No spinal or paraspinal tenderness.  Mild cogwheel rigidity in UE b/l.   Neuro: A&Ox3.  CN II-XII intact, intact facial sensation b/l, no facial droop.  5/5 motor strength in UE and LE b/l.  Tactile sensation intact in UE and LE b/l.  No pronator drift b/l.  No focal deficits.  Psychiatric: Normal mood, slight masked facies.

## 2023-08-24 NOTE — H&P ADULT - PROBLEM SELECTOR PLAN 1
Pt with 2 weeks of right-sided facial numbness, with sudden worsening on Wednesday around 7am. Per pt's wife, facial numbness now constant and interfering with speech, though speech fluent on exam. Unclear etiology. Can be in setting of increased dose of Sinemet, though unilateral facial involvement unusual. Differential also includes acute CVA, though pt with no focal deficits on exam, including sensory deficits. CTH noncontrast and CTA H/N with IV contrast with no acute findings.  - Neurology consulted on admission, appreciate recs  - MRI brain w/wo contrast with thin cuts through brainstem ordered  - As per Neurology, decrease home Sinemet 25 mg-100 mg 2 tablets 5x/day (7am, 10am, 2pm, 6pm, 9pm) with entacapone 200 mg 1 tablet 5x/day to Sinemet 25 mg-100 mg 1.5 tablets 5x/day (7am, 10am, 2pm, 6pm, 9pm) with entacapone 200 mg 1 tablet 5x/day  - Outpatient Neurology f/u prior to starting Rytary 145 mg 2 to 3 tablets at bedtime  - Check TSH, folate, vitamin B12, and 25-OH vitamin D  - General neuro checks q8hrs

## 2023-08-24 NOTE — ED PROVIDER NOTE - PHYSICAL EXAMINATION
Gen: well appearing, in no acute distress   Head: normal appearing  HEENT: normal conjunctiva, oral mucosa moist, vision grossly intact   Lung: no respiratory distress, speaking in full sentences, CTA b/l, no wheeze, crackles or rhonchi   CV: regular rate and rhythm, no murmurs  Abd: soft, non distended, non tender   MSK: no visible deformities, did not assess gait   Neuro: A&Ox3, normal 5/5 strength in LATOYA UE and LE, decreased sensation over R side of face V2/V3 distribution, no pronator drift, past pointing with finger to nose test on the R   Skin: Warm, no rashes   Psych: normal affect

## 2023-08-24 NOTE — ED PROVIDER NOTE - OBJECTIVE STATEMENT
51 y/o M with PMHx of Parkinsons presents to ED c/o R sided weakness, nausea and headache x 2 weeks. Describes numb/tingling sensation on the R side of his face, drooling, R arm and R leg, feeling off balance and "dizzy" described as feeling like he is going to pass out and room spinning. Was recently seen by neurologist, who follows for Parkinsons, medications were adjusted but he has been tolerating them well. Neurology encouraged ED visit if symptoms persisted. Denies fevers, recent illness, vision changes, eye pain, chest pain, difficulty breathing, falls, head trauma, syncope. 53 y/o M with PMHx of Parkinsons presents to ED c/o R sided weakness, nausea and headache x 2 weeks. Describes numb/tingling sensation on the R side of his face, drooling, R arm and R leg, feeling off balance and "dizzy" described as feeling like he is going to pass out and room spinning. Episodes sound pre-syncopal in nature. Was recently seen by neurologist, who follows for Parkinsons, medications were adjusted but he has been tolerating them well. Neurology encouraged ED visit if symptoms persisted. Denies fevers, recent illness, vision changes, eye pain, chest pain, difficulty breathing, falls, head trauma.

## 2023-08-24 NOTE — H&P ADULT - PROBLEM SELECTOR PLAN 7
Last BM was on Wednesday with normal-caliber and normal-colored stools and no blood.  - C/w Miralax 17 g daily PRN

## 2023-08-24 NOTE — H&P ADULT - PROBLEM SELECTOR PLAN 9
- DVT ppx: Lovenox SQ 40 mg daily  - Diet: Soft and bite sized pending S&S evaluation - DVT ppx: Lovenox SQ 40 mg daily  - Diet: Soft and bite sized pending S&S evaluation  - C/w vitamin B12 1000 mcg daily and vitamin D3 50,000 IU once a week

## 2023-08-24 NOTE — H&P ADULT - HISTORY OF PRESENT ILLNESS
51 yo man, mainly Khmer speaking, with history of Parkinson's disease, HLD, GERD, glaucoma,  51 yo man, mainly Amharic speaking, with history of Parkinson's disease, HLD, GERD, glaucoma, and constipation presents with 2 weeks of right-sided facial numbness and diffuse headache, both becoming worse Thursday morning. Pt i    52M RH Amharic speaking with Parkinson's presents to ED c/o R facial numbness 2 weeks. Pt's wife at bedside. Pt describes intermittent numbness in the lower part of his R face, no paresthesias or facial heaviness, no numbness/tingling/weakness in his RUE or RLE, more constant in the 2 days. Of note, pt reports an intermittent headache, also started around 2 weeks ago, described as holocephalic, "pounding", gradual onset, intermittent lasting hours, not positional, no vision changes. Pt with worsening insomnia. Denies fevers, recent illness, vision changes, eye pain, chest pain, difficulty breathing, falls, head trauma, change in gait.    For pt's Parkinson's disease, dx in 2019, follows with Dr. Clovis Jones. Pt with considerable fluctuations. Gait is stable but has off state RLE dystonia and marked overnight akinesia affecting his sleep. Pt currently on Sinemet  2 tablets every 3-3 and half hours (5x/day) with entacapone, recently increased in mid 7/2023 from 4x/day. Pt reports worsening generalized body shaking (dyskinesias) and orthostatic symptoms (lightheadedness with position changes) after he takes the sinemet, and overall feels worse with the increased medications. Pt was also prescribed Rytary 145 mg 2 to 3 tablets at bedtime, however pt has not started this medication yet.    53 yo man, mainly Sinhala speaking, with history of Parkinson's disease, HLD, GERD, glaucoma, and constipation presents with 2 weeks of right-sided facial numbness and diffuse headache, both becoming worse Thursday morning. Pt's right-sided facial numbness was initially intermittent and gradually improving over the past 2 weeks, becoming   52M RH Sinhala speaking with Parkinson's presents to ED c/o R facial numbness 2 weeks. Pt's wife at bedside. Pt describes intermittent numbness in the lower part of his R face, no paresthesias or facial heaviness, no numbness/tingling/weakness in his RUE or RLE, more constant in the 2 days. Of note, pt reports an intermittent headache, also started around 2 weeks ago, described as holocephalic, "pounding", gradual onset, intermittent lasting hours, not positional, no vision changes. Pt with worsening insomnia. Denies fevers, recent illness, vision changes, eye pain, chest pain, difficulty breathing, falls, head trauma, change in gait.    For pt's Parkinson's disease, dx in 2019, follows with Dr. Clovis Jones. Pt with considerable fluctuations. Gait is stable but has off state RLE dystonia and marked overnight akinesia affecting his sleep. Pt currently on Sinemet  2 tablets every 3-3 and half hours (5x/day) with entacapone, recently increased in mid 7/2023 from 4x/day. Pt reports worsening generalized body shaking (dyskinesias) and orthostatic symptoms (lightheadedness with position changes) after he takes the sinemet, and overall feels worse with the increased medications. Pt was also prescribed Rytary 145 mg 2 to 3 tablets at bedtime, however pt has not started this medication yet.    53 yo man, mainly Armenian speaking, with history of Parkinson's disease, HLD, GERD, glaucoma, and constipation presents with 2 weeks of right-sided facial numbness and diffuse headache, both becoming worse Thursday morning. Pt's right-sided facial numbness was intermittent and gradually improving over the past 2 weeks, but then suddenly became worse on Thursday around 7am.   52M RH Armenian speaking with Parkinson's presents to ED c/o R facial numbness 2 weeks. Pt's wife at bedside. Pt describes intermittent numbness in the lower part of his R face, no paresthesias or facial heaviness, no numbness/tingling/weakness in his RUE or RLE, more constant in the 2 days. Of note, pt reports an intermittent headache, also started around 2 weeks ago, described as holocephalic, "pounding", gradual onset, intermittent lasting hours, not positional, no vision changes. Pt with worsening insomnia. Denies fevers, recent illness, vision changes, eye pain, chest pain, difficulty breathing, falls, head trauma, change in gait.    For pt's Parkinson's disease, dx in 2019, follows with Dr. Clovis Jones. Pt with considerable fluctuations. Gait is stable but has off state RLE dystonia and marked overnight akinesia affecting his sleep. Pt currently on Sinemet  2 tablets every 3-3 and half hours (5x/day) with entacapone, recently increased in mid 7/2023 from 4x/day. Pt reports worsening generalized body shaking (dyskinesias) and orthostatic symptoms (lightheadedness with position changes) after he takes the sinemet, and overall feels worse with the increased medications. Pt was also prescribed Rytary 145 mg 2 to 3 tablets at bedtime, however pt has not started this medication yet.    53 yo man, mainly Lao speaking, with history of Parkinson's disease, HLD, GERD, glaucoma, and constipation presents with 2 weeks of right-sided facial numbness and diffuse headache, both becoming worse Wednesday morning. Pt's right-sided facial numbness was intermittent and gradually improving over the past 2 weeks, but then suddenly became worse on Wednesday around 7am, especially on the right side of his jaw, and constant since then. Pt's wife notes that pt has had difficulty speaking properly due to the numbness, no facial droop or dysphagia. Pt's headache, described as diffuse, pounding, and intermittent, also became more intense on Wednesday morning, around the same time as the facial numbness. No associated fever, chills, neck pain/stiffness, or vision changes. Pt has progressive right-sided weakness due to his Parkinson's, but the weakness has also been getting more rapidly worse recently. Pt is now only able to walk for 10-15 minutes at a time, whereas previously pt was able to walk for prolonged periods without assistance or any complaints. Pt's wife reports that on July 18th, pt went to see his neurologist, Dr. Clovis Jones, who increased pt's dose of Sinemet with Entacapone from 4 times a day to 5 times a day. Pt was also prescribed Rytary 145 mg 2 to 3 tablets at bedtime, but pt has not started this medication yet. Since the Sinemet dose increase, pt has been having severe generalized body shaking/tremors, lightheadedness with positional changes, and fragmented sleep. No falls, syncope, confusion, shortness of breath, abdominal pain, nausea, vomiting, diarrhea, melena, BRBPR, or urinary symptoms. Pt's wife also reports that pt has been having on-and-off, achy right-sided chest pain for months, but the chest pain was more frequent on Wednesday and occurred again Thursday morning. No associated shortness of breath, palpitations, or LE pain/swelling. Pt currently denies any chest pain.     In the ED,  T 97.8-98.1, HR 67-71, -120/66-74, RR 17, SpO2 100% RA.   CTH noncontrast and CTA H/N with IV contrast with no acute findings.

## 2023-08-24 NOTE — ED PROVIDER NOTE - CLINICAL SUMMARY MEDICAL DECISION MAKING FREE TEXT BOX
Onofre: Parkinson's. Side effects w/ meds. A few days of R pounding TURPIN and less ability to walk; also, numbness R face. DDx: worsening Parkinson's, sub-acute CVA, brain tumor, (less likely) brain hemorrhage. Check CT angio brain/neck. 53 y/o M with PMHx of Parkinsons presents to ED c/o R sided weakness, nausea and headache x 2 weeks. Decreased sensation on R side of face and body, normal strength. Abnormal finger to nose test. Symptoms ongoing for 2 weeks, outside of window for stroke protocol. Unable to see neurologist as an outpatient for several months. Plan to check labs, CTA to r/o ICH, aneurysm or new mass, likely will require neuro consult and admission for MRI/further workup. Suspect possibly worsening of parkinson's/autonomic dysfunction.     Onofre: Parkinson's. Side effects w/ meds. A few days of R pounding TURPIN and less ability to walk; also, numbness R face. DDx: worsening Parkinson's, sub-acute CVA, brain tumor, (less likely) brain hemorrhage. Check CT angio brain/neck.

## 2023-08-24 NOTE — H&P ADULT - PROBLEM SELECTOR PLAN 2
Pt also with 2 weeks of an intermittent headache, described as diffuse and pounding, becoming more intense on Wednesday around the same time as the facial numbness. Currently headache free. CTH noncontrast and CTA H/N with IV contrast with no acute findings.  - F/u MRI brain w/wo contrast  - Monitor for recurrence of headache and obtain stat CTH noncontrast if needed  - PO Tylenol PRN for pain control

## 2023-08-24 NOTE — ED PROVIDER NOTE - ATTENDING CONTRIBUTION TO CARE
I performed a face-to-face evaluation of the patient and performed a history and physical examination. I agree with the history and physical examination. If this was a PA visit, I personally saw the patient with the PA and performed a substantive portion of the visit including all aspects of the medical decision making.    Parkinson's. Side effects w/ meds. A few days of R pounding TURPIN and less ability to walk; also, numbness R face. DDx: worsening Parkinson's, sub-acute CVA, brain tumor, (less likely) brain hemorrhage. Check CT angio brain/neck.

## 2023-08-24 NOTE — CONSULT NOTE ADULT - ASSESSMENT
Impression:  1. 2 weeks of intermittent R facial numbness (V2-3) 2/2 unclear etiology  2. headache now resolved possibly tension headache in setting of insomnia, CTH neg for large stroke, ICH  3. Parkinson's disease with worsened dyskinesias and orthostatic symptoms with recent increase in sinemet    Recommendations:  [] outpatient MRI brain w/w/o with thin cuts through brainstem   [] decrease home Sinemet  2 tablets 5x/day (7am, 10am, 2pm, 5pm, 9pm) with entacapone to Sinemet  1.5tabs 5x/day (7am, 10am, 2pm, 5pm, 9pm) with entacapone  [] outpatient neurology f/u with Dr. Clovis Jones at [address] [place] [address]    Case discussed with Dr. Thurman and Dr. Jones 52M [facility] speaking with Parkinson's presents to ED c/o R facial numbness and headache for 2 weeks. Pt reports worsening generalized body shaking (dyskinesias) and orthostatic symptoms (lightheadedness with position changes) after he takes the sinemet, and overall feels worse with the increased medications. CTH/CTA neg.     Impression:  1. 2 weeks of intermittent R facial numbness (V2-3) 2/2 unclear etiology  2. headache now resolved possibly tension headache in setting of insomnia, CTH neg for large stroke, ICH  3. Parkinson's disease with worsened dyskinesias and orthostatic symptoms with recent increase in sinemet    Recommendations:  [] outpatient MRI brain w/w/o with thin cuts through brainstem   [] decrease home Sinemet  2 tablets 5x/day (7am, 10am, 2pm, 5pm, 9pm) with entacapone to Sinemet  1.5tabs 5x/day (7am, 10am, 2pm, 5pm, 9pm) with entacapone  [] outpatient neurology f/u prior to starting Rytary 145 mg 2 to 3 tablets at bedtime  [] outpatient neurology f/u with Dr. Clovis Jones at [address] [place] [address]    Case discussed with Dr. Thurman and Dr. Jones 52M [facility] speaking with Parkinson's presents to ED c/o R facial numbness and headache for 2 weeks. Pt reports worsening generalized body shaking (dyskinesias) and orthostatic symptoms (lightheadedness with position changes) after he takes the sinemet, and overall feels worse with the increased medications. CTH/CTA neg.     Impression:  1. 2 weeks of intermittent R facial numbness (V2-3) 2/2 unclear etiology  2. headache now resolved possibly tension headache in setting of insomnia, CTH neg for large stroke, ICH  3. Parkinson's disease with worsened dyskinesias and orthostatic symptoms with recent increase in sinemet    Recommendations:  [] outpatient MRI brain w/w/o with thin cuts through brainstem   [] decrease home Sinemet  2 tablets 5x/day (7am, 10am, 2pm, 6pm, 9pm) with entacapone to Sinemet  1.5tabs 5x/day (7am, 10am, 2pm, 6pm, 9pm) with entacapone  [] outpatient neurology f/u prior to starting Rytary 145 mg 2 to 3 tablets at bedtime  [] outpatient neurology f/u with Dr. Clovis Jones at [address] [place] [address]    Case discussed with Dr. Thurman and Dr. Jones

## 2023-08-24 NOTE — H&P ADULT - NSICDXPASTMEDICALHX_GEN_ALL_CORE_FT
PAST MEDICAL HISTORY:  Constipation     GERD (gastroesophageal reflux disease)     Glaucoma     Hyperlipidemia     Parkinsons

## 2023-08-24 NOTE — PATIENT PROFILE ADULT - HOME ACCESSIBILITY CONCERNS
Vaccine Information Statement(s) or the Emergency Use Authorization was given today. This has been reviewed, questions answered, and verbal consent given by Patient for injection(s) and administration of Human papillomavirus (HPV) (patient waited the recommended 15 mins after receiving the HPV vaccine) and Tetanus/Diphtheria/Pertussis (Tdap).      Patient tolerated without incident. See immunization grid for documentation.    Recent PHQ 2/9 Score    PHQ 2:  Date Adult PHQ 2 Score Adult PHQ 2 Interpretation   7/14/2022 6 Further screening needed       PHQ 9:  Date Adult PHQ 9 Score Adult PHQ 9 Interpretation   7/14/2022 14 Moderate Depression     Most Recent GENESIS 7 Score       Date GENESIS 7 Score   7/14/2022 18     Health Maintenance Due   Topic Date Due   • Varicella Vaccine (1 of 2 - 2-dose childhood series) Never done   • COVID-19 Vaccine (2 - Moderna series) 09/21/2021       Patient is due for topics as listed above but is not proceeding with Immunization(s) COVID-19 and Varicella at this time. Will set up Covid 19 for another date.           none

## 2023-08-25 ENCOUNTER — TRANSCRIPTION ENCOUNTER (OUTPATIENT)
Age: 53
End: 2023-08-25

## 2023-08-25 VITALS
OXYGEN SATURATION: 98 % | DIASTOLIC BLOOD PRESSURE: 82 MMHG | TEMPERATURE: 98 F | HEART RATE: 65 BPM | RESPIRATION RATE: 18 BRPM | SYSTOLIC BLOOD PRESSURE: 124 MMHG

## 2023-08-25 DIAGNOSIS — R51.9 HEADACHE, UNSPECIFIED: ICD-10-CM

## 2023-08-25 DIAGNOSIS — G20 PARKINSON'S DISEASE: ICD-10-CM

## 2023-08-25 DIAGNOSIS — K21.9 GASTRO-ESOPHAGEAL REFLUX DISEASE WITHOUT ESOPHAGITIS: ICD-10-CM

## 2023-08-25 DIAGNOSIS — K59.00 CONSTIPATION, UNSPECIFIED: ICD-10-CM

## 2023-08-25 DIAGNOSIS — Z29.9 ENCOUNTER FOR PROPHYLACTIC MEASURES, UNSPECIFIED: ICD-10-CM

## 2023-08-25 DIAGNOSIS — H40.9 UNSPECIFIED GLAUCOMA: ICD-10-CM

## 2023-08-25 DIAGNOSIS — E78.5 HYPERLIPIDEMIA, UNSPECIFIED: ICD-10-CM

## 2023-08-25 DIAGNOSIS — R07.9 CHEST PAIN, UNSPECIFIED: ICD-10-CM

## 2023-08-25 DIAGNOSIS — R20.0 ANESTHESIA OF SKIN: ICD-10-CM

## 2023-08-25 LAB
24R-OH-CALCIDIOL SERPL-MCNC: 58.5 NG/ML — SIGNIFICANT CHANGE UP (ref 30–80)
ALBUMIN SERPL ELPH-MCNC: 5 G/DL — SIGNIFICANT CHANGE UP (ref 3.3–5)
ALP SERPL-CCNC: 80 U/L — SIGNIFICANT CHANGE UP (ref 40–120)
ALT FLD-CCNC: 23 U/L — SIGNIFICANT CHANGE UP (ref 4–41)
ANION GAP SERPL CALC-SCNC: 15 MMOL/L — HIGH (ref 7–14)
AST SERPL-CCNC: 20 U/L — SIGNIFICANT CHANGE UP (ref 4–40)
BILIRUB SERPL-MCNC: 0.8 MG/DL — SIGNIFICANT CHANGE UP (ref 0.2–1.2)
BUN SERPL-MCNC: 9 MG/DL — SIGNIFICANT CHANGE UP (ref 7–23)
CALCIUM SERPL-MCNC: 9.9 MG/DL — SIGNIFICANT CHANGE UP (ref 8.4–10.5)
CHLORIDE SERPL-SCNC: 93 MMOL/L — LOW (ref 98–107)
CO2 SERPL-SCNC: 25 MMOL/L — SIGNIFICANT CHANGE UP (ref 22–31)
CREAT SERPL-MCNC: 0.94 MG/DL — SIGNIFICANT CHANGE UP (ref 0.5–1.3)
D DIMER BLD IA.RAPID-MCNC: <150 NG/ML DDU — SIGNIFICANT CHANGE UP
EGFR: 99 ML/MIN/1.73M2 — SIGNIFICANT CHANGE UP
FOLATE SERPL-MCNC: 19.6 NG/ML — HIGH (ref 3.1–17.5)
GLUCOSE SERPL-MCNC: 107 MG/DL — HIGH (ref 70–99)
HCT VFR BLD CALC: 44.4 % — SIGNIFICANT CHANGE UP (ref 39–50)
HGB BLD-MCNC: 15.2 G/DL — SIGNIFICANT CHANGE UP (ref 13–17)
MAGNESIUM SERPL-MCNC: 2.4 MG/DL — SIGNIFICANT CHANGE UP (ref 1.6–2.6)
MCHC RBC-ENTMCNC: 29.2 PG — SIGNIFICANT CHANGE UP (ref 27–34)
MCHC RBC-ENTMCNC: 34.2 GM/DL — SIGNIFICANT CHANGE UP (ref 32–36)
MCV RBC AUTO: 85.4 FL — SIGNIFICANT CHANGE UP (ref 80–100)
NRBC # BLD: 0 /100 WBCS — SIGNIFICANT CHANGE UP (ref 0–0)
NRBC # FLD: 0 K/UL — SIGNIFICANT CHANGE UP (ref 0–0)
PHOSPHATE SERPL-MCNC: 3.7 MG/DL — SIGNIFICANT CHANGE UP (ref 2.5–4.5)
PLATELET # BLD AUTO: 222 K/UL — SIGNIFICANT CHANGE UP (ref 150–400)
POTASSIUM SERPL-MCNC: 4.1 MMOL/L — SIGNIFICANT CHANGE UP (ref 3.5–5.3)
POTASSIUM SERPL-SCNC: 4.1 MMOL/L — SIGNIFICANT CHANGE UP (ref 3.5–5.3)
PROT SERPL-MCNC: 8.8 G/DL — HIGH (ref 6–8.3)
RBC # BLD: 5.2 M/UL — SIGNIFICANT CHANGE UP (ref 4.2–5.8)
RBC # FLD: 12.5 % — SIGNIFICANT CHANGE UP (ref 10.3–14.5)
SODIUM SERPL-SCNC: 133 MMOL/L — LOW (ref 135–145)
TSH SERPL-MCNC: 3.78 UIU/ML — SIGNIFICANT CHANGE UP (ref 0.27–4.2)
TSH SERPL-MCNC: 3.84 UIU/ML — SIGNIFICANT CHANGE UP (ref 0.27–4.2)
VIT B12 SERPL-MCNC: 970 PG/ML — HIGH (ref 200–900)
VIT B12 SERPL-MCNC: 998 PG/ML — HIGH (ref 200–900)
WBC # BLD: 7.17 K/UL — SIGNIFICANT CHANGE UP (ref 3.8–10.5)
WBC # FLD AUTO: 7.17 K/UL — SIGNIFICANT CHANGE UP (ref 3.8–10.5)

## 2023-08-25 PROCEDURE — 99239 HOSP IP/OBS DSCHRG MGMT >30: CPT

## 2023-08-25 PROCEDURE — 70553 MRI BRAIN STEM W/O & W/DYE: CPT | Mod: 26

## 2023-08-25 PROCEDURE — ZZZZZ: CPT

## 2023-08-25 RX ORDER — CARBIDOPA AND LEVODOPA 25; 100 MG/1; MG/1
1.5 TABLET ORAL
Qty: 225 | Refills: 0
Start: 2023-08-25 | End: 2023-09-23

## 2023-08-25 RX ORDER — ENOXAPARIN SODIUM 100 MG/ML
40 INJECTION SUBCUTANEOUS EVERY 24 HOURS
Refills: 0 | Status: DISCONTINUED | OUTPATIENT
Start: 2023-08-25 | End: 2023-08-25

## 2023-08-25 RX ORDER — CARBIDOPA AND LEVODOPA 25; 100 MG/1; MG/1
1.5 TABLET ORAL
Qty: 0 | Refills: 0 | DISCHARGE
Start: 2023-08-25

## 2023-08-25 RX ORDER — ACETAMINOPHEN 500 MG
2 TABLET ORAL
Qty: 0 | Refills: 0 | DISCHARGE
Start: 2023-08-25

## 2023-08-25 RX ORDER — POLYETHYLENE GLYCOL 3350 17 G/17G
17 POWDER, FOR SOLUTION ORAL DAILY
Refills: 0 | Status: DISCONTINUED | OUTPATIENT
Start: 2023-08-25 | End: 2023-08-25

## 2023-08-25 RX ORDER — PANTOPRAZOLE SODIUM 20 MG/1
40 TABLET, DELAYED RELEASE ORAL
Refills: 0 | Status: DISCONTINUED | OUTPATIENT
Start: 2023-08-25 | End: 2023-08-25

## 2023-08-25 RX ORDER — CHOLECALCIFEROL (VITAMIN D3) 125 MCG
1 CAPSULE ORAL
Refills: 0 | DISCHARGE

## 2023-08-25 RX ORDER — ACETAMINOPHEN 500 MG
650 TABLET ORAL EVERY 6 HOURS
Refills: 0 | Status: DISCONTINUED | OUTPATIENT
Start: 2023-08-25 | End: 2023-08-25

## 2023-08-25 RX ORDER — CHOLECALCIFEROL (VITAMIN D3) 125 MCG
50000 CAPSULE ORAL
Refills: 0 | Status: DISCONTINUED | OUTPATIENT
Start: 2023-08-25 | End: 2023-08-25

## 2023-08-25 RX ORDER — SIMETHICONE 80 MG/1
2 TABLET, CHEWABLE ORAL
Qty: 0 | Refills: 0 | DISCHARGE
Start: 2023-08-25

## 2023-08-25 RX ORDER — ENTACAPONE 200 MG/1
1 TABLET, FILM COATED ORAL
Refills: 0 | DISCHARGE

## 2023-08-25 RX ORDER — ZOLPIDEM TARTRATE 10 MG/1
1 TABLET ORAL
Refills: 0 | DISCHARGE

## 2023-08-25 RX ORDER — ENTACAPONE 200 MG/1
200 TABLET, FILM COATED ORAL
Refills: 0 | Status: DISCONTINUED | OUTPATIENT
Start: 2023-08-25 | End: 2023-08-25

## 2023-08-25 RX ORDER — ACETAMINOPHEN 500 MG
975 TABLET ORAL EVERY 6 HOURS
Refills: 0 | Status: DISCONTINUED | OUTPATIENT
Start: 2023-08-25 | End: 2023-08-25

## 2023-08-25 RX ORDER — PANTOPRAZOLE SODIUM 20 MG/1
1 TABLET, DELAYED RELEASE ORAL
Qty: 0 | Refills: 0 | DISCHARGE
Start: 2023-08-25

## 2023-08-25 RX ORDER — ESOMEPRAZOLE MAGNESIUM 40 MG/1
1 CAPSULE, DELAYED RELEASE ORAL
Refills: 0 | DISCHARGE

## 2023-08-25 RX ORDER — PREGABALIN 225 MG/1
1 CAPSULE ORAL
Refills: 0 | DISCHARGE

## 2023-08-25 RX ORDER — LATANOPROST 0.05 MG/ML
1 SOLUTION/ DROPS OPHTHALMIC; TOPICAL
Refills: 0 | DISCHARGE

## 2023-08-25 RX ORDER — POLYETHYLENE GLYCOL 3350 17 G/17G
17 POWDER, FOR SOLUTION ORAL
Qty: 0 | Refills: 0 | DISCHARGE
Start: 2023-08-25

## 2023-08-25 RX ORDER — SIMETHICONE 80 MG/1
160 TABLET, CHEWABLE ORAL EVERY 12 HOURS
Refills: 0 | Status: DISCONTINUED | OUTPATIENT
Start: 2023-08-25 | End: 2023-08-25

## 2023-08-25 RX ORDER — CARBIDOPA AND LEVODOPA 25; 100 MG/1; MG/1
1 TABLET ORAL
Refills: 0 | DISCHARGE

## 2023-08-25 RX ORDER — PREGABALIN 225 MG/1
1000 CAPSULE ORAL DAILY
Refills: 0 | Status: DISCONTINUED | OUTPATIENT
Start: 2023-08-25 | End: 2023-08-25

## 2023-08-25 RX ORDER — ATORVASTATIN CALCIUM 80 MG/1
1 TABLET, FILM COATED ORAL
Refills: 0 | DISCHARGE

## 2023-08-25 RX ORDER — CARBIDOPA AND LEVODOPA 25; 100 MG/1; MG/1
1.5 TABLET ORAL
Refills: 0 | Status: DISCONTINUED | OUTPATIENT
Start: 2023-08-25 | End: 2023-08-25

## 2023-08-25 RX ADMIN — CARBIDOPA AND LEVODOPA 1.5 TABLET(S): 25; 100 TABLET ORAL at 09:32

## 2023-08-25 RX ADMIN — PANTOPRAZOLE SODIUM 40 MILLIGRAM(S): 20 TABLET, DELAYED RELEASE ORAL at 06:50

## 2023-08-25 RX ADMIN — PREGABALIN 1000 MICROGRAM(S): 225 CAPSULE ORAL at 13:51

## 2023-08-25 RX ADMIN — CARBIDOPA AND LEVODOPA 1.5 TABLET(S): 25; 100 TABLET ORAL at 13:52

## 2023-08-25 RX ADMIN — CARBIDOPA AND LEVODOPA 1.5 TABLET(S): 25; 100 TABLET ORAL at 06:50

## 2023-08-25 RX ADMIN — ENOXAPARIN SODIUM 40 MILLIGRAM(S): 100 INJECTION SUBCUTANEOUS at 06:50

## 2023-08-25 RX ADMIN — ZOLPIDEM TARTRATE 5 MILLIGRAM(S): 10 TABLET ORAL at 00:49

## 2023-08-25 RX ADMIN — LATANOPROST 1 DROP(S): 0.05 SOLUTION/ DROPS OPHTHALMIC; TOPICAL at 00:07

## 2023-08-25 RX ADMIN — Medication 650 MILLIGRAM(S): at 06:10

## 2023-08-25 RX ADMIN — ENTACAPONE 200 MILLIGRAM(S): 200 TABLET, FILM COATED ORAL at 13:52

## 2023-08-25 RX ADMIN — ENTACAPONE 200 MILLIGRAM(S): 200 TABLET, FILM COATED ORAL at 09:32

## 2023-08-25 RX ADMIN — Medication 650 MILLIGRAM(S): at 05:10

## 2023-08-25 NOTE — DISCHARGE NOTE PROVIDER - NSFOLLOWUPCLINICS_GEN_ALL_ED_FT
[facility]  [address] [place] [address], [place] [address]  Phone: [phone]  Fax:

## 2023-08-25 NOTE — PHYSICAL THERAPY INITIAL EVALUATION ADULT - ADDITIONAL COMMENTS
Patient lives in the basement of a private house, +5 steps to enter. He denies use of an assistive device at baseline and reports independence with ADLs. Patient reports he recently has felt weaker on the right side of his body.     Patient left semi-supine in NAD, + call bell.

## 2023-08-25 NOTE — DISCHARGE NOTE PROVIDER - PROVIDER TOKENS
PROVIDER:[TOKEN:[[id number]]],PROVIDER:[TOKEN:[[id number]]],PROVIDER:[TOKEN:[[id number]]]

## 2023-08-25 NOTE — DISCHARGE NOTE PROVIDER - NSDCFUADDAPPT_GEN_ALL_CORE_FT
Follow up with your primary care physician for further monitoring in 1-2 weeks. Please call to arrange appointment.     Outpatient neurology f/u with Dr. Clovis Jones at [address] [place] [address] - please call for appointment

## 2023-08-25 NOTE — DISCHARGE NOTE PROVIDER - NSDCFUSCHEDAPPT_GEN_ALL_CORE_FT
Clovis Jones  [facility]  NEUROLOGY [address] [facility]  Scheduled Appointment: 11/09/2023

## 2023-08-25 NOTE — DISCHARGE NOTE PROVIDER - NSDCMRMEDTOKEN_GEN_ALL_CORE_FT
atorvastatin 10 mg oral tablet: 1 tab(s) orally once a day (at bedtime)  carbidopa-levodopa 25 mg-100 mg oral tablet: 1 tab(s) orally 5 times a day at 7 am, 10 am, 2 pm, 6 pm, 9 pm  entacapone 200 mg oral tablet: 1 tab(s) orally 5 times a day at 7 am, 10 am, 2 pm, 6 pm, 9 pm  esomeprazole 20 mg oral delayed release tablet: 1 tab(s) orally 2 times a day  latanoprost 0.005% ophthalmic solution: 1 drop(s) in each eye once a day (at bedtime)  Vitamin B12 1000 mcg oral tablet: 1 tab(s) orally once a day  Vitamin D3 1250 mcg (50,000 intl units) oral capsule: 1 cap(s) orally once a week on Mondays  zolpidem 10 mg oral tablet: 1 tab(s) orally once a day (at bedtime) as needed for  insomnia   atorvastatin 10 mg oral tablet: 1 tab(s) orally once a day (at bedtime)  carbidopa-levodopa 25 mg-100 mg oral tablet: 1.5 tab(s) orally 5 times a day at 7am, 10am, 2pm, 6pm, and 9pm  entacapone 200 mg oral tablet: 1 tab(s) orally 5 times a day at 7 am, 10 am, 2 pm, 6 pm, 9 pm  latanoprost 0.005% ophthalmic solution: 1 drop(s) in each eye once a day (at bedtime)  pantoprazole 40 mg oral delayed release tablet: 1 tab(s) orally once a day (before a meal)  polyethylene glycol 3350 oral powder for reconstitution: 17 gram(s) orally once a day As needed Constipation  simethicone 80 mg oral tablet, chewable: 2 tab(s) orally every 12 hours As needed Gas  Vitamin B12 1000 mcg oral tablet: 1 tab(s) orally once a day  Vitamin D3 1250 mcg (50,000 intl units) oral capsule: 1 cap(s) orally once a week on Mondays  zolpidem 10 mg oral tablet: 1 tab(s) orally once a day (at bedtime) as needed for  insomnia   atorvastatin 10 mg oral tablet: 1 tab(s) orally once a day (at bedtime)  carbidopa-levodopa 25 mg-100 mg oral tablet: 1.5 tab(s) orally 5 times a day at 7am, 10am, 2pm, 6pm, and 9pm  entacapone 200 mg oral tablet: 1 tab(s) orally 5 times a day at 7 am, 10 am, 2 pm, 6 pm, 9 pm  latanoprost 0.005% ophthalmic solution: 1 drop(s) in each eye once a day (at bedtime)  Outpatient PT: 3x week for 4 weeks  pantoprazole 40 mg oral delayed release tablet: 1 tab(s) orally once a day (before a meal)  polyethylene glycol 3350 oral powder for reconstitution: 17 gram(s) orally once a day As needed Constipation  simethicone 80 mg oral tablet, chewable: 2 tab(s) orally every 12 hours As needed Gas  Vitamin B12 1000 mcg oral tablet: 1 tab(s) orally once a day  Vitamin D3 1250 mcg (50,000 intl units) oral capsule: 1 cap(s) orally once a week on Mondays  zolpidem 10 mg oral tablet: 1 tab(s) orally once a day (at bedtime) as needed for  insomnia

## 2023-08-25 NOTE — OCCUPATIONAL THERAPY INITIAL EVALUATION ADULT - PATIENT PROFILE REVIEW, REHAB EVAL
Message left with patient to contact office to schedule appointment to discuss potential surgical options with Dr. Jacinto Barriga.
yes

## 2023-08-25 NOTE — DISCHARGE NOTE NURSING/CASE MANAGEMENT/SOCIAL WORK - PATIENT PORTAL LINK FT
You can access the FollowMyHealth Patient Portal offered by [facility] by registering at the following website: [email or web address]. By joining [employer]’s FollowMyHealth portal, you will also be able to view your health information using other applications (apps) compatible with our system.

## 2023-08-25 NOTE — DISCHARGE NOTE NURSING/CASE MANAGEMENT/SOCIAL WORK - NSDCFUADDAPPT_GEN_ALL_CORE_FT
Follow up with your primary care physician for further monitoring in 1-2 weeks. Please call to arrange appointment.     Outpatient neurology f/u with Dr. Clovis Jones at [address] [place] [address] - please call for appointment

## 2023-08-25 NOTE — OCCUPATIONAL THERAPY INITIAL EVALUATION ADULT - LIVES WITH, PROFILE
Patient lives in a private home in the basement with his spouse and daughter with 5 steps to access.

## 2023-08-25 NOTE — PHYSICAL THERAPY INITIAL EVALUATION ADULT - PERTINENT HX OF CURRENT PROBLEM, REHAB EVAL
Patient is a 52 year old man with history of Parkinson's disease, HLD, GERD, glaucoma, and constipation presents with 2 weeks of right-sided facial numbness and diffuse headache following an increase in Sinemet medication.

## 2023-08-25 NOTE — PHYSICAL THERAPY INITIAL EVALUATION ADULT - NSPTDISCHREC_GEN_A_CORE
Pt appears to be at or near functional baseline, will be discharged from PT program. Outpatient recommendation to improve strength in right UE and LE./Outpatient PT

## 2023-08-25 NOTE — DISCHARGE NOTE PROVIDER - HOSPITAL COURSE
51 yo man, mainly Maori speaking, with history of Parkinson's disease, HLD, GERD, glaucoma, and constipation presents with 2 weeks of right-sided facial numbness and diffuse headache in the setting of increasing sinemet c/f medication side effect.    #Right facial numbness and headache  - Pt with 2 weeks of right-sided facial numbness and intermittent diffuse/pounding headache, with sudden worsening on 8/23 around 7am. Per pt's wife, facial numbness became constant and interfering with speech, though speech fluent on exam.   - recent increase in Sinemet dose  - CTH noncontrast and CTA H/N with IV contrast with no acute findings  - MRI brain w/wo contrast with thin cuts through brainstem:   - decreased home Sinemet 25 mg-100 mg 2 tablets 5x/day (7am, 10am, 2pm, 6pm, 9pm) with entacapone 200 mg 1 tablet 5x/day to Sinemet 25 mg-100 mg 1.5 tablets 5x/day (7am, 10am, 2pm, 6pm, 9pm) with entacapone 200 mg 1 tablet 5x/day with improvement in symptoms  - TSH, folate, b12 wnl  Plan:  - continue Sinemet 25 mg-100 mg 1.5 tablets 5x/day (7am, 10am, 2pm, 6pm, 9pm) with entacapone 200 mg 1 tablet 5x/day  - Outpatient Neurology f/u prior to starting Rytary 145 mg 2 to 3 tablets at bedtime    #Parkinson's disease  - Pt was diagnosed with Parkinson's in 2019. On Sinemet and entacapone, but possibly with recent progression of disease.  - As per Neurology, decreased home Sinemet 25 mg-100 mg 2 tablets 5x/day (7am, 10am, 2pm, 6pm, 9pm) with entacapone 200 mg 1 tablet 5x/day to Sinemet 25 mg-100 mg 1.5 tablets 5x/day (7am, 10am, 2pm, 6pm, 9pm) with entacapone 200 mg 1 tablet 5x/day  - continue Sinemet 25 mg-100 mg 1.5 tablets 5x/day (7am, 10am, 2pm, 6pm, 9pm) with entacapone 200 mg 1 tablet 5x/day  - f/u Outpatient Neurology f/u with Dr. Clovis Jones at [address] [place] [address], pt's neurologist. 53 yo man, mainly Uzbek speaking, with history of Parkinson's disease, HLD, GERD, glaucoma, and constipation presents with 2 weeks of right-sided facial numbness and diffuse headache in the setting of increasing sinemet c/f medication side effect.    #Right facial numbness and headache  - Pt with 2 weeks of right-sided facial numbness and intermittent diffuse/pounding headache, with sudden worsening on 8/23 around 7am. Per pt's wife, facial numbness became constant and interfering with speech, though speech fluent on exam.   - recent increase in Sinemet dose  - CTH noncontrast and CTA H/N with IV contrast with no acute findings  - MRI brain w/wo contrast with thin cuts through brainstem: No evidence of acute infarct or midline shift. Questionable enhancement near the right geniculate ganglion that could represent a small inflammatory change or   could be artifactual in nature.  - decreased home Sinemet 25 mg-100 mg 2 tablets 5x/day (7am, 10am, 2pm, 6pm, 9pm) with entacapone 200 mg 1 tablet 5x/day to Sinemet 25 mg-100 mg 1.5 tablets 5x/day (7am, 10am, 2pm, 6pm, 9pm) with entacapone 200 mg 1 tablet 5x/day with improvement in symptoms  - TSH, folate, b12 wnl  Plan:  - continue Sinemet 25 mg-100 mg 1.5 tablets 5x/day (7am, 10am, 2pm, 6pm, 9pm) with entacapone 200 mg 1 tablet 5x/day  - Outpatient Neurology f/u prior to starting Rytary 145 mg 2 to 3 tablets at bedtime    #Parkinson's disease  - Pt was diagnosed with Parkinson's in 2019. On Sinemet and entacapone, but possibly with recent progression of disease.  - As per Neurology, decreased home Sinemet 25 mg-100 mg 2 tablets 5x/day (7am, 10am, 2pm, 6pm, 9pm) with entacapone 200 mg 1 tablet 5x/day to Sinemet 25 mg-100 mg 1.5 tablets 5x/day (7am, 10am, 2pm, 6pm, 9pm) with entacapone 200 mg 1 tablet 5x/day  - continue Sinemet 25 mg-100 mg 1.5 tablets 5x/day (7am, 10am, 2pm, 6pm, 9pm) with entacapone 200 mg 1 tablet 5x/day  - f/u Outpatient Neurology f/u with Dr. Clovis Jones at [address] [place] [address], 's neurologist. 53 yo man, mainly Spanish speaking, with history of Parkinson's disease, HLD, GERD, glaucoma, and constipation presents with 2 weeks of right-sided facial numbness and diffuse headache in the setting of increasing sinemet c/f medication side effect.    #Right facial numbness and headache  - Pt with 2 weeks of right-sided facial numbness and intermittent diffuse/pounding headache, with sudden worsening on 8/23 around 7am. Per pt's wife, facial numbness became constant and interfering with speech, though speech fluent on exam.   - recent increase in Sinemet dose  - CTH noncontrast and CTA H/N with IV contrast with no acute findings  - MRI brain w/wo contrast with thin cuts through brainstem: No evidence of acute infarct or midline shift. Questionable enhancement near the right geniculate ganglion that could represent a small inflammatory change or   could be artifactual in nature.  - decreased home Sinemet 25 mg-100 mg 2 tablets 5x/day (7am, 10am, 2pm, 6pm, 9pm) with entacapone 200 mg 1 tablet 5x/day to Sinemet 25 mg-100 mg 1.5 tablets 5x/day (7am, 10am, 2pm, 6pm, 9pm) with entacapone 200 mg 1 tablet 5x/day with improvement in symptoms  - TSH, folate, b12 wnl  Plan:  - continue Sinemet 25 mg-100 mg 1.5 tablets 5x/day (7am, 10am, 2pm, 6pm, 9pm) with entacapone 200 mg 1 tablet 5x/day  - Outpatient Neurology f/u prior to starting Rytary 145 mg 2 to 3 tablets at bedtime     #Parkinson's disease  - Pt was diagnosed with Parkinson's in 2019. On Sinemet and entacapone, but possibly with recent progression of disease.  - As per Neurology, decreased home Sinemet 25 mg-100 mg 2 tablets 5x/day (7am, 10am, 2pm, 6pm, 9pm) with entacapone 200 mg 1 tablet 5x/day to Sinemet 25 mg-100 mg 1.5 tablets 5x/day (7am, 10am, 2pm, 6pm, 9pm) with entacapone 200 mg 1 tablet 5x/day  - continue Sinemet 25 mg-100 mg 1.5 tablets 5x/day (7am, 10am, 2pm, 6pm, 9pm) with entacapone 200 mg 1 tablet 5x/day  - f/u Outpatient Neurology f/u with Dr. Clovis Jones at [address] [place] [address], 's neurologist.

## 2023-08-25 NOTE — DISCHARGE NOTE PROVIDER - CARE PROVIDERS DIRECT ADDRESSES
[email or web address]

## 2023-08-25 NOTE — DISCHARGE NOTE PROVIDER - NSDCCPCAREPLAN_GEN_ALL_CORE_FT
PRINCIPAL DISCHARGE DIAGNOSIS  Diagnosis: Headache  Assessment and Plan of Treatment: You came into the hospital for headache and face numbness. We did a CT scan and MRI of your head which did not show any cause. We decreased the dose of your parkinson's medication and your symptoms improved. These symptoms were likely a side effect of your Parkinson medications. Please see the discharge medication reconciliation for intructions on which medications you should be taking when you leave the hospital.

## 2023-08-25 NOTE — OCCUPATIONAL THERAPY INITIAL EVALUATION ADULT - PERTINENT HX OF CURRENT PROBLEM, REHAB EVAL
52 year old man with history of Parkinson's disease, HLD, GERD, glaucoma, and constipation presents with 2 weeks of right-sided facial numbness and diffuse headache following an increase in Sinemet medication. MRI brain negative for acute infarct.

## 2023-08-25 NOTE — OCCUPATIONAL THERAPY INITIAL EVALUATION ADULT - GENERAL OBSERVATIONS, REHAB EVAL
Patient received semisupine in bed in NAD; agreeable to participate in OT evaluation. +tele. /74 mmHg. HR 72 bpm.

## 2023-08-25 NOTE — DISCHARGE NOTE PROVIDER - CARE PROVIDER_API CALL
Clovis Jones  Neurology  [address] [place] [address]  Phone: [phone]  Fax: [phone]  Follow Up Time:     Juliette Waddell  Gastroenterology  [address] [place] [address]  Phone: [phone]  Fax: [phone]  Follow Up Time:     Rick Robb  Neurology  [address] [facility], [address]  [place] [address]  Phone: [phone]  Fax: [phone]  Follow Up Time:

## 2023-08-25 NOTE — DISCHARGE NOTE PROVIDER - NSDCCPTREATMENT_GEN_ALL_CORE_FT
PRINCIPAL PROCEDURE  Procedure: MR brain  Findings and Treatment: There is no diffusion restriction to suggest acute infarct. There is no   hydrocephalus. There are a few nonspecific foci of increased T2/FLAIR   signal along the periventricular white matter. The visualized extra axial   spaces and basal cisterns are within normal limits. There is no midline   shift or mass effect present. There is no abnormal enhancement within the   visualized brain parenchyma. There is no gross mass in the bilateral   IACs. There is questionable enhancement near the right geniculate   ganglion, images 46 and 45 of series 13.  The craniocervical junction is within normal limits. The pituitary is   unremarkable. The major intracranial vessels demonstrate the expected   signal void related to vascular flow. There is mucosal thickening   throughout the paranasal sinuses. The mastoid air cells are well aerated.   The visualized orbits are within normal limits.  IMPRESSION:  1.  No evidence of acute infarct or midline shift.  2.  Questionable enhancement near the right geniculate ganglion as   discussed above. This could represent a small inflammatory change or   could be artifactual in nature. Clinical correlation is recommended.

## 2023-08-25 NOTE — CHART NOTE - NSCHARTNOTEFT_GEN_A_CORE
Patient examined at bedside this AM.   Recommendations:  Patient can have the MRI brain w/w/o with thin cuts through brainstem completed as an Outpatient.   decrease home Sinemet  2 tablets 5x/day (7am, 10am, 2pm, 6pm, 9pm) with entacapone to Sinemet  1.5tabs 5x/day (7am, 10am, 2pm, 6pm, 9pm) with entacapone  outpatient neurology f/u prior to starting Rytary 145 mg 2 to 3 tablets at bedtime  outpatient neurology f/u with Dr. Clovis Jones at [address] [place] [address]  No further inpatient neurological workup.     Donis Treviño MD PGY4   Case discussed with Dr. Thurman. Patient examined at bedside this AM.   Recommendations:  Patient can have the MRI brain w/w/o with thin cuts through brainstem completed as an Outpatient.   decrease home Sinemet  2 tablets 5x/day (7am, 10am, 2pm, 6pm, 9pm) with entacapone to Sinemet  1.5tabs 5x/day (7am, 10am, 2pm, 6pm, 9pm) with entacapone  outpatient neurology f/u prior to starting Rytary 145 mg 2 to 3 tablets at bedtime  outpatient neurology f/u with Dr. Clovis Jones at [address] [place] [address]  No further inpatient neurological workup.     Donis Treviño MD PGY4   Case discussed with Dr. Thurman.  Thank you

## 2023-08-31 PROBLEM — K21.9 GASTRO-ESOPHAGEAL REFLUX DISEASE WITHOUT ESOPHAGITIS: Chronic | Status: ACTIVE | Noted: 2023-08-25

## 2023-08-31 PROBLEM — H40.9 UNSPECIFIED GLAUCOMA: Chronic | Status: ACTIVE | Noted: 2023-08-25

## 2023-08-31 PROBLEM — K59.00 CONSTIPATION, UNSPECIFIED: Chronic | Status: ACTIVE | Noted: 2023-08-25

## 2023-08-31 PROBLEM — E78.5 HYPERLIPIDEMIA, UNSPECIFIED: Chronic | Status: ACTIVE | Noted: 2023-08-25

## 2023-08-31 PROBLEM — G20 PARKINSON'S DISEASE: Chronic | Status: ACTIVE | Noted: 2023-08-24

## 2023-09-19 ENCOUNTER — APPOINTMENT (OUTPATIENT)
Dept: UROLOGY | Facility: CLINIC | Age: 53
End: 2023-09-19
Payer: MEDICAID

## 2023-09-19 VITALS
HEIGHT: 64 IN | SYSTOLIC BLOOD PRESSURE: 110 MMHG | WEIGHT: 149 LBS | HEART RATE: 67 BPM | OXYGEN SATURATION: 98 % | BODY MASS INDEX: 25.44 KG/M2 | DIASTOLIC BLOOD PRESSURE: 66 MMHG | TEMPERATURE: 98.1 F

## 2023-09-19 PROCEDURE — 99204 OFFICE O/P NEW MOD 45 MIN: CPT

## 2023-09-21 LAB
BACTERIA UR CULT: NORMAL
PSA SERPL-MCNC: 4.72 NG/ML

## 2023-10-01 ENCOUNTER — OUTPATIENT (OUTPATIENT)
Dept: OUTPATIENT SERVICES | Facility: HOSPITAL | Age: 53
LOS: 1 days | End: 2023-10-01
Payer: MEDICAID

## 2023-10-01 ENCOUNTER — APPOINTMENT (OUTPATIENT)
Dept: MRI IMAGING | Facility: IMAGING CENTER | Age: 53
End: 2023-10-01
Payer: MEDICAID

## 2023-10-01 ENCOUNTER — RESULT REVIEW (OUTPATIENT)
Age: 53
End: 2023-10-01

## 2023-10-01 DIAGNOSIS — R97.20 ELEVATED PROSTATE SPECIFIC ANTIGEN [PSA]: ICD-10-CM

## 2023-10-01 PROCEDURE — 76498P: CUSTOM | Mod: 26

## 2023-10-01 PROCEDURE — A9585: CPT

## 2023-10-01 PROCEDURE — 72197 MRI PELVIS W/O & W/DYE: CPT | Mod: 26

## 2023-10-01 PROCEDURE — 72197 MRI PELVIS W/O & W/DYE: CPT

## 2023-10-01 PROCEDURE — 76498 UNLISTED MR PROCEDURE: CPT

## 2023-10-10 ENCOUNTER — APPOINTMENT (OUTPATIENT)
Dept: UROLOGY | Facility: CLINIC | Age: 53
End: 2023-10-10
Payer: MEDICAID

## 2023-10-10 VITALS
SYSTOLIC BLOOD PRESSURE: 104 MMHG | BODY MASS INDEX: 25.44 KG/M2 | DIASTOLIC BLOOD PRESSURE: 63 MMHG | TEMPERATURE: 98 F | HEIGHT: 64 IN | WEIGHT: 149 LBS | OXYGEN SATURATION: 98 % | HEART RATE: 73 BPM

## 2023-10-10 PROCEDURE — 99214 OFFICE O/P EST MOD 30 MIN: CPT

## 2023-10-18 ENCOUNTER — OUTPATIENT (OUTPATIENT)
Dept: OUTPATIENT SERVICES | Facility: HOSPITAL | Age: 53
LOS: 1 days | End: 2023-10-18

## 2023-10-18 DIAGNOSIS — R97.20 ELEVATED PROSTATE SPECIFIC ANTIGEN [PSA]: ICD-10-CM

## 2023-10-18 PROCEDURE — C8001: CPT

## 2023-11-06 ENCOUNTER — APPOINTMENT (OUTPATIENT)
Dept: GASTROENTEROLOGY | Facility: CLINIC | Age: 53
End: 2023-11-06

## 2023-11-07 ENCOUNTER — APPOINTMENT (OUTPATIENT)
Dept: UROLOGY | Facility: CLINIC | Age: 53
End: 2023-11-07

## 2023-11-09 ENCOUNTER — APPOINTMENT (OUTPATIENT)
Dept: NEUROLOGY | Facility: CLINIC | Age: 53
End: 2023-11-09
Payer: MEDICAID

## 2023-11-09 VITALS
HEART RATE: 70 BPM | BODY MASS INDEX: 23.9 KG/M2 | SYSTOLIC BLOOD PRESSURE: 102 MMHG | DIASTOLIC BLOOD PRESSURE: 69 MMHG | HEIGHT: 64 IN | WEIGHT: 140 LBS

## 2023-11-09 PROCEDURE — 99214 OFFICE O/P EST MOD 30 MIN: CPT

## 2023-11-15 ENCOUNTER — APPOINTMENT (OUTPATIENT)
Dept: UROLOGY | Facility: CLINIC | Age: 53
End: 2023-11-15
Payer: MEDICAID

## 2023-11-15 VITALS
OXYGEN SATURATION: 98 % | TEMPERATURE: 98.2 F | DIASTOLIC BLOOD PRESSURE: 72 MMHG | WEIGHT: 140 LBS | HEART RATE: 72 BPM | BODY MASS INDEX: 23.9 KG/M2 | HEIGHT: 64 IN | SYSTOLIC BLOOD PRESSURE: 108 MMHG

## 2023-11-15 PROCEDURE — 55700: CPT | Mod: 22

## 2023-11-15 PROCEDURE — 76942 ECHO GUIDE FOR BIOPSY: CPT | Mod: 59

## 2023-11-15 PROCEDURE — 76872 US TRANSRECTAL: CPT

## 2023-11-15 PROCEDURE — 76377 3D RENDER W/INTRP POSTPROCES: CPT

## 2023-11-28 ENCOUNTER — APPOINTMENT (OUTPATIENT)
Dept: UROLOGY | Facility: CLINIC | Age: 53
End: 2023-11-28
Payer: MEDICAID

## 2023-11-28 VITALS
TEMPERATURE: 97.7 F | HEART RATE: 78 BPM | OXYGEN SATURATION: 98 % | BODY MASS INDEX: 23.9 KG/M2 | HEIGHT: 64 IN | WEIGHT: 140 LBS | SYSTOLIC BLOOD PRESSURE: 104 MMHG | DIASTOLIC BLOOD PRESSURE: 65 MMHG

## 2023-11-28 DIAGNOSIS — R97.20 ELEVATED PROSTATE, SPECIFIC ANTIGEN [PSA]: ICD-10-CM

## 2023-11-28 DIAGNOSIS — R93.5 ABNORMAL FINDINGS ON DIAGNOSTIC IMAGING OF OTHER ABDOMINAL REGIONS, INCLUDING RETROPERITONEUM: ICD-10-CM

## 2023-11-28 LAB — PROSTATE BIOPSY: NORMAL

## 2023-11-28 PROCEDURE — 99215 OFFICE O/P EST HI 40 MIN: CPT

## 2023-12-06 ENCOUNTER — NON-APPOINTMENT (OUTPATIENT)
Age: 53
End: 2023-12-06

## 2023-12-06 ENCOUNTER — APPOINTMENT (OUTPATIENT)
Dept: RADIATION ONCOLOGY | Facility: CLINIC | Age: 53
End: 2023-12-06
Payer: MEDICAID

## 2023-12-06 DIAGNOSIS — Z78.9 OTHER SPECIFIED HEALTH STATUS: ICD-10-CM

## 2023-12-06 DIAGNOSIS — Z82.3 FAMILY HISTORY OF STROKE: ICD-10-CM

## 2023-12-06 PROCEDURE — 99204 OFFICE O/P NEW MOD 45 MIN: CPT

## 2023-12-14 ENCOUNTER — APPOINTMENT (OUTPATIENT)
Dept: CT IMAGING | Facility: HOSPITAL | Age: 53
End: 2023-12-14

## 2023-12-14 ENCOUNTER — OUTPATIENT (OUTPATIENT)
Dept: OUTPATIENT SERVICES | Facility: HOSPITAL | Age: 53
LOS: 1 days | End: 2023-12-14
Payer: MEDICAID

## 2023-12-14 DIAGNOSIS — C61 MALIGNANT NEOPLASM OF PROSTATE: ICD-10-CM

## 2023-12-14 PROCEDURE — 77263 THER RADIOLOGY TX PLNG CPLX: CPT

## 2023-12-14 PROCEDURE — 77290 THER RAD SIMULAJ FIELD CPLX: CPT

## 2023-12-27 ENCOUNTER — TRANSCRIPTION ENCOUNTER (OUTPATIENT)
Age: 53
End: 2023-12-27

## 2024-01-01 NOTE — DISCUSSION/SUMMARY
[FreeTextEntry1] : Patient with asymmetric bradykinesia, rigidity and reported tremor with robust LD response that is c/w with parkinsonism likely iPD. He experiences wearing offs during the day that are c/b sleep fragmentation and constipation. \par \par Patient was counseled on the following recommendations:\par \par - add azilect 1mg qd \par - cont sinemet  2 tabs 10-2-6\par - take sinemet ER  1 tab qhs\par - take melatonin 1-3mg qhs\par - increase water and fiber intake for constipation management\par - increase cardiovascular based exercises\par \par f/u 3-4months [place] standing orders have been placed. Refer to infant’s chart for further details.

## 2024-01-02 PROCEDURE — 77338 DESIGN MLC DEVICE FOR IMRT: CPT

## 2024-01-02 PROCEDURE — 77300 RADIATION THERAPY DOSE PLAN: CPT

## 2024-01-02 PROCEDURE — 77301 RADIOTHERAPY DOSE PLAN IMRT: CPT

## 2024-01-04 ENCOUNTER — NON-APPOINTMENT (OUTPATIENT)
Age: 54
End: 2024-01-04

## 2024-01-04 PROCEDURE — 77014: CPT

## 2024-01-04 PROCEDURE — G6015: CPT

## 2024-01-05 PROCEDURE — 77014: CPT

## 2024-01-05 PROCEDURE — G6015: CPT

## 2024-01-08 PROCEDURE — 77014: CPT

## 2024-01-08 PROCEDURE — G6015: CPT

## 2024-01-09 ENCOUNTER — APPOINTMENT (OUTPATIENT)
Dept: UROLOGY | Facility: CLINIC | Age: 54
End: 2024-01-09
Payer: MEDICAID

## 2024-01-09 VITALS
HEIGHT: 64 IN | TEMPERATURE: 98.1 F | HEART RATE: 69 BPM | BODY MASS INDEX: 23.9 KG/M2 | WEIGHT: 140 LBS | OXYGEN SATURATION: 99 % | DIASTOLIC BLOOD PRESSURE: 75 MMHG | SYSTOLIC BLOOD PRESSURE: 108 MMHG

## 2024-01-09 PROCEDURE — 99214 OFFICE O/P EST MOD 30 MIN: CPT

## 2024-01-09 PROCEDURE — G6015: CPT

## 2024-01-09 PROCEDURE — 77014: CPT

## 2024-01-10 ENCOUNTER — NON-APPOINTMENT (OUTPATIENT)
Age: 54
End: 2024-01-10

## 2024-01-10 ENCOUNTER — APPOINTMENT (OUTPATIENT)
Dept: NEUROLOGY | Facility: CLINIC | Age: 54
End: 2024-01-10
Payer: MEDICAID

## 2024-01-10 VITALS
HEART RATE: 84 BPM | WEIGHT: 144 LBS | BODY MASS INDEX: 24.59 KG/M2 | HEIGHT: 64 IN | SYSTOLIC BLOOD PRESSURE: 138 MMHG | DIASTOLIC BLOOD PRESSURE: 78 MMHG

## 2024-01-10 PROCEDURE — 77014: CPT

## 2024-01-10 PROCEDURE — 77336 RADIATION PHYSICS CONSULT: CPT

## 2024-01-10 PROCEDURE — G6015: CPT

## 2024-01-10 PROCEDURE — 99215 OFFICE O/P EST HI 40 MIN: CPT

## 2024-01-10 PROCEDURE — 77427 RADIATION TX MANAGEMENT X5: CPT

## 2024-01-10 RX ORDER — TRAZODONE HYDROCHLORIDE 50 MG/1
50 TABLET ORAL
Qty: 30 | Refills: 5 | Status: DISCONTINUED | COMMUNITY
Start: 2022-10-04 | End: 2024-01-10

## 2024-01-10 RX ORDER — CARBIDOPA AND LEVODOPA 25; 100 MG/1; MG/1
25-100 TABLET, EXTENDED RELEASE ORAL
Qty: 60 | Refills: 3 | Status: DISCONTINUED | COMMUNITY
Start: 2023-11-09 | End: 2024-01-10

## 2024-01-11 ENCOUNTER — NON-APPOINTMENT (OUTPATIENT)
Age: 54
End: 2024-01-11

## 2024-01-11 PROCEDURE — G6015: CPT

## 2024-01-11 PROCEDURE — 77014: CPT

## 2024-01-12 PROCEDURE — 77014: CPT

## 2024-01-12 PROCEDURE — G6015: CPT

## 2024-01-12 NOTE — PHYSICAL EXAM
[FreeTextEntry1] : +2 Facial hypomimia, reduced blink rate Vertical eye movements intact without square wave jerks +1 Hypophonic speech +2 Rigidity of neck rotation +2 R>L Rigidity of limbs present with contralateral activation  0 Resting tremor 0 Action tremor 0 Postural tremor  +2 R>L Bradykinesia with finger tapping, hand supination/pronation, foot tapping, foot stomping. No dysmetria with finger to nose Gait: able to stand with hands crossed and without assistance +2 Flexed posture Slow gait initiation, decreased stride length, reduced R>L arm swing, no freezing of gait upon turning, turns en bloc negative pull test  No LID observed

## 2024-01-12 NOTE — HISTORY OF PRESENT ILLNESS
[FreeTextEntry1] : Patient state he started taking Sinemet ER as instructed but felt it caused headaches and felt more off. He then started taking entacapone again 5 times per day with each dose of meds.   Patient feels wearing offs between 30min-2 hours multiple times per day. Wearing offs are in the bradykinesia, tremor worse on the right side and freezing of gait. He continues to have headaches and flushing immediately after taking medications. These headaches started since starting Sinemet. But patient states the headache has intensified in the last 6 months. Headaches subsides on its own.  Due to these wearing offs he needs help with dressing and ambulating.   He started treatment for prostate cancer this week he needs to complete a total of 28 radiation sessions. He is not currently doing PT, finished a course in December.   Patient denies recent falls.  Non-motor symptoms:   +Constipation qod on laxatives while on radiation +urinary sxs are well managed with urological medications +insomnia not well controlled, takes zolpidem sometimes but uses it sparingly -Denies dysphagia   Meds Sinemet  2 tabs 6am- 0945 -0145pm- 5:45 -945pm   Entacapone 5 times per day   melatonin 5mg finasteride 5mg protonix  Prior  selegiline. rasagiline not covered by insurance  neupro- not covered by insurance rytary : headache  Patient PCP office Dr Maira Peacock fax # is [phone]  PMHx: glaucoma

## 2024-01-12 NOTE — DISCUSSION/SUMMARY
[FreeTextEntry1] : PD x 4 -5 years with unpredictable motor fluctuations likely exacerbated by prostate ca and treatment.   Patient was counseled on the following recommendations:  -Refer to PT for gait and balance training -trial of rytary 95mg with each dose of LD -Continue Entacapone  f/u in 4 months

## 2024-01-17 PROCEDURE — 77014: CPT

## 2024-01-17 PROCEDURE — G6015: CPT

## 2024-01-18 ENCOUNTER — NON-APPOINTMENT (OUTPATIENT)
Age: 54
End: 2024-01-18

## 2024-01-18 PROCEDURE — G6015: CPT

## 2024-01-18 PROCEDURE — 77014: CPT

## 2024-01-18 NOTE — VITALS
[Maximal Pain Intensity: 0/10] : 0/10 [Least Pain Intensity: 0/10] : 0/10 [80: Normal activity with effort; some signs or symptoms of disease.] : 80: Normal activity with effort; some signs or symptoms of disease.  [ECOG Performance Status: 1 - Restricted in physically strenuous activity but ambulatory and able to carry out work of a light or sedentary nature] : Performance Status: 1 - Restricted in physically strenuous activity but ambulatory and able to carry out work of a light or sedentary nature, e.g., light house work, office work [6 - Distress Level] : Distress Level: 6

## 2024-01-19 PROCEDURE — G6015: CPT

## 2024-01-19 PROCEDURE — 77336 RADIATION PHYSICS CONSULT: CPT

## 2024-01-19 PROCEDURE — 77427 RADIATION TX MANAGEMENT X5: CPT

## 2024-01-19 PROCEDURE — 77014: CPT

## 2024-01-21 NOTE — REVIEW OF SYSTEMS
[Negative] : Allergic/Immunologic [Anal Pain: Grade 0] : Anal Pain: Grade 0 [Constipation: Grade 0] : Constipation: Grade 0 [Diarrhea: Grade 0] : Diarrhea: Grade 0 [Dyspepsia: Grade 0] : Dyspepsia: Grade 0 [Dysphagia: Grade 0] : Dysphagia: Grade 0 [Esophagitis: Grade 0] : Esophagitis: Grade 0 [Fecal Incontinence: Grade 0] : Fecal Incontinence: Grade 0 [Gastroparesis: Grade 0] : Gastroparesis: Grade 0 [Nausea: Grade 0] : Nausea: Grade 0 [Proctitis: Grade 0] : Proctitis: Grade 0 [Rectal Pain: Grade 0] : Rectal Pain: Grade 0 [Small Intestinal Obstruction: Grade 0] : Small Intestinal Obstruction: Grade 0 [Vomiting: Grade 0] : Vomiting: Grade 0 [Hematuria: Grade 0] : Hematuria: Grade 0 [Urinary Incontinence: Grade 0] : Urinary Incontinence: Grade 0  [Urinary Retention: Grade 1 - Urinary, suprapubic or intermittent catheter placement not indicated; able to void with some residual] : Urinary Retention: Grade 1 - Urinary, suprapubic or intermittent catheter placement not indicated; able to void with some residual [Urinary Tract Pain: Grade 1 - Mild pain] : Urinary Tract Pain: Grade 1 - Mild pain [Urinary Urgency: Grade 0] : Urinary Urgency: Grade 0 [Urinary Frequency: Grade 1 - Present] : Urinary Frequency: Grade 1 - Present [FreeTextEntry5] : FARAZ [FreeTextEntry7] : GERD [de-identified] : Parkinson disease

## 2024-01-21 NOTE — DISEASE MANAGEMENT
[BiopsyDate] : 11/2023 [MeasuredProstateVolume] : 34 [TotalCores] : 13 [TotalPositiveCores] : 2 [MaxCoreInvolvement] : 50 [TTNM] : x [MTNM] : x [NTNM] : x

## 2024-01-21 NOTE — DISEASE MANAGEMENT
[Biopsy with Fusion] : Patient had a biopsy with fusion on [7(3+4)] : Template Biopsy [place] Score: 7(3+4) [4] : 4 [Clinical] : TNM Stage: c [II] : II [MeasuredProstateVolume] : 34 [BiopsyDate] : 11/2023 [TotalCores] : 13 [TotalPositiveCores] : 2 [MaxCoreInvolvement] : 50 [TTNM] : x [NTNM] : x [MTNM] : x

## 2024-01-21 NOTE — HISTORY OF PRESENT ILLNESS
[FreeTextEntry1] : 53 y/o male with Parkinson disease, GERD, HLD, [place] 3+4 prostate adenocarcinoma presents to discuss radiation treatment options for prostate cancer.  Initially Pt presented with hematuria at visit with PCP in 10/2023. 10/2023 -- MRI showed prostate vol 34 cc. No SVI, No LAD. PI- RADS 4 11/2023 -- PSA 4.72 11/15/2023 -- biopsy showed prostate adenocarcinoma. [place] 3+4 in 1 core, 50% of tissue involved. [place] 3+3 in 1 core , 5% involvement. Total 13 cores biopsied.  12/6/2023 Consultation. Nocturia 2-3 times. No hematuria, abd pain, fever.   1/10/2024 OTV. 5/28 fractions of radiation to prostate completed. No changes from preRT baseline.

## 2024-01-22 PROCEDURE — G6015: CPT

## 2024-01-22 PROCEDURE — 77014: CPT

## 2024-01-23 PROCEDURE — G6015: CPT

## 2024-01-23 PROCEDURE — 77014: CPT

## 2024-01-24 PROCEDURE — 77014: CPT

## 2024-01-24 PROCEDURE — G6015: CPT

## 2024-01-25 ENCOUNTER — NON-APPOINTMENT (OUTPATIENT)
Age: 54
End: 2024-01-25

## 2024-01-25 PROCEDURE — G6015: CPT

## 2024-01-28 NOTE — HISTORY OF PRESENT ILLNESS
[FreeTextEntry1] : 51 y/o male with Parkinson disease, GERD, HLD, [place] 3+4 prostate adenocarcinoma presents to discuss radiation treatment options for prostate cancer.  Initially Pt presented with hematuria at visit with PCP in 10/2023. 10/2023 -- MRI showed prostate vol 34 cc. No SVI, No LAD. PI- RADS 4 11/2023 -- PSA 4.72 11/15/2023 -- biopsy showed prostate adenocarcinoma. [place] 3+4 in 1 core, 50% of tissue involved. [place] 3+3 in 1 core , 5% involvement. Total 13 cores biopsied.  12/6/2023 Consultation. Nocturia 2-3 times. No hematuria, abd pain, fever.   1/25/2024 OTV. 14/28 fractions of radiation to prostate completed. c/o burning sensation during urination and urinary hesitancy, on flomax from urologist, may consider to increase to BID. No abd pain, fever, hematuria. Bowel movement is okay. Pt wants to see neurologist for Parkinson disease.

## 2024-01-28 NOTE — DISEASE MANAGEMENT
[BiopsyDate] : 11/2023 [MeasuredProstateVolume] : 34 [TotalCores] : 13 [TotalPositiveCores] : 2 [MaxCoreInvolvement] : 50 [TTNM] : x [NTNM] : x [MTNM] : x

## 2024-01-29 PROCEDURE — G6015: CPT

## 2024-01-29 PROCEDURE — 77427 RADIATION TX MANAGEMENT X5: CPT

## 2024-01-29 PROCEDURE — 77336 RADIATION PHYSICS CONSULT: CPT

## 2024-01-29 PROCEDURE — 77014: CPT

## 2024-01-30 PROCEDURE — 77014: CPT

## 2024-01-30 PROCEDURE — G6015: CPT

## 2024-01-31 ENCOUNTER — NON-APPOINTMENT (OUTPATIENT)
Age: 54
End: 2024-01-31

## 2024-01-31 PROCEDURE — G6015: CPT

## 2024-01-31 PROCEDURE — 77014: CPT

## 2024-02-01 PROCEDURE — 77014: CPT

## 2024-02-01 PROCEDURE — G6015: CPT

## 2024-02-05 PROCEDURE — G6015: CPT

## 2024-02-05 PROCEDURE — 77014: CPT

## 2024-02-07 ENCOUNTER — APPOINTMENT (OUTPATIENT)
Dept: NEUROLOGY | Facility: CLINIC | Age: 54
End: 2024-02-07
Payer: MEDICAID

## 2024-02-07 VITALS
DIASTOLIC BLOOD PRESSURE: 76 MMHG | SYSTOLIC BLOOD PRESSURE: 112 MMHG | OXYGEN SATURATION: 100 % | HEART RATE: 82 BPM | BODY MASS INDEX: 28.27 KG/M2 | HEIGHT: 60 IN | TEMPERATURE: 97.6 F | WEIGHT: 144 LBS

## 2024-02-07 DIAGNOSIS — G20.A1 PARKINSON'S DISEASE WITHOUT DYSKINESIA, WITHOUT MENTION OF FLUCTUATIONS: ICD-10-CM

## 2024-02-07 PROCEDURE — 99205 OFFICE O/P NEW HI 60 MIN: CPT

## 2024-02-07 PROCEDURE — 77427 RADIATION TX MANAGEMENT X5: CPT

## 2024-02-07 PROCEDURE — 77336 RADIATION PHYSICS CONSULT: CPT

## 2024-02-07 PROCEDURE — 77014: CPT

## 2024-02-07 PROCEDURE — G6015: CPT

## 2024-02-07 RX ORDER — ZOLPIDEM TARTRATE 5 MG/1
5 TABLET, FILM COATED ORAL
Refills: 0 | Status: ACTIVE | COMMUNITY

## 2024-02-08 ENCOUNTER — NON-APPOINTMENT (OUTPATIENT)
Age: 54
End: 2024-02-08

## 2024-02-08 PROCEDURE — G6015: CPT

## 2024-02-08 PROCEDURE — 77014: CPT

## 2024-02-08 NOTE — HISTORY OF PRESENT ILLNESS
[FreeTextEntry1] : The patient has been mostly Parkinson's disease and is here for second opinion.  Specifically, the patient feels that he was given Sinemet extended release multiple times and has been feeling off having more headaches and not responding to this treatment.  He has been feeling more sick on the Sinemet  ER multiple times per day as well.  Patient has been having more trouble with ambulation as well.  He symptoms is significantly worse in the afternoon but the family is unsure at 4 times specifically.  As per prior records, "Patient state he started taking Sinemet ER as instructed but felt it caused headaches and felt more off. He then started taking entacapone again 5 times per day with each dose of meds.  Patient feels wearing offs between 30min-2 hours multiple times per day. Wearing offs are in the bradykinesia, tremor worse on the right side and freezing of gait. He continues to have headaches and flushing immediately after taking medications. These headaches started since starting Sinemet. But patient states the headache has intensified in the last 6 months. Headaches subsides on its own. Due to these wearing offs he needs help with dressing and ambulating.  He started treatment for prostate cancer this week he needs to complete a total of 28 radiation sessions. He is not currently doing PT, finished a course in December.  Patient denies recent falls.  Non-motor symptoms: +Constipation qod on laxatives while on radiation +urinary sxs are well managed with urological medications +insomnia not well controlled, takes zolpidem sometimes but uses it sparingly -Denies dysphagia  Meds Sinemet  2 tabs 6am- 0945 -0145pm- 5:45 -945pm Entacapone 5 times per day melatonin 5mg finasteride 5mg protonix  Prior selegiline. rasagiline not covered by insurance neupro- not covered by insurance rytary : headache  Patient PCP office Dr Maira Peacock fax # is [phone]  PMHx: glaucoma"

## 2024-02-08 NOTE — PHYSICAL EXAM
[General Appearance - Alert] : alert [General Appearance - In No Acute Distress] : in no acute distress [Person] : oriented to person [Place] : oriented to place [Time] : oriented to time [Concentration Intact] : normal concentrating ability [Naming Objects] : no difficulty naming common objects [Fluency] : fluency intact [Comprehension] : comprehension intact [Vocabulary] : adequate range of vocabulary [Cranial Nerves Optic (II)] : visual acuity intact bilaterally,  visual fields full to confrontation, pupils equal round and reactive to light [Cranial Nerves Oculomotor (III)] : extraocular motion intact [Cranial Nerves Trigeminal (V)] : facial sensation intact symmetrically [Cranial Nerves Facial (VII)] : face symmetrical [Cranial Nerves Vestibulocochlear (VIII)] : hearing was intact bilaterally [Cranial Nerves Glossopharyngeal (IX)] : tongue and palate midline [Cranial Nerves Accessory (XI - Cranial And Spinal)] : head turning and shoulder shrug symmetric [Cranial Nerves Hypoglossal (XII)] : there was no tongue deviation with protrusion [Motor Strength] : muscle strength was normal in all four extremities [Involuntary Movements] : no involuntary movements were seen [Sensation Tactile Decrease] : light touch was intact [Coordination - Dysmetria Impaired Finger-to-Nose Bilateral] : not present [Coordination - Dysmetria Impaired Heel-to-Shin Bilateral] : not present [1+] : Ankle jerk left 1+ [Plantar Reflex Right Only] : normal on the right [Plantar Reflex Left Only] : normal on the left [FreeTextEntry6] : Mild cogwheeling in the right arm minimal bradykinesia, decreased arm swing when walking without shuffling and without trouble turning.

## 2024-02-08 NOTE — ASSESSMENT
[FreeTextEntry1] : The patient is here for second opinion for Parkinson's disease.  On neurological examination today Mild cogwheeling in the right arm minimal bradykinesia, decreased arm swing when walking without shuffling and without trouble turning.  Which is consistent with Parkinson's diagnosis.  He has had poor response, has had significant side effects to Sinemet extended release multiple times per day.  Advised patient to follow-up with movement specialist for second opinion as well as to keep track of his symptoms when and at what time specifically did become worse, he states so in the afternoon, in order to adjust Sinemet doses.  Can consider less frequent extended release Sinemet.  Additionally, advised patient that he may want to have discussion with movement specialist about DBS, consider Parkinson's rehab for titration of medications and trial of joann chi.  Patient's movement specialist advised patient try "PT for gait and balance training -trial of rytary 95mg with each dose of LD -Continue Entacapone"  I spent the time noted on the day of this patient encounter preparing for, providing and documenting the above E/M service and counseling and educate patient on differential, workup, disease course, and treatment/management. Education was provided to the patient during this encounter. All questions and concerns were answered and addressed in detail. The patient verbalized understanding and agreed to plan. Patient was advised to continue to monitor for neurologic symptoms and to notify my office or go to the nearest emergency room if there are any changes. Any orders/referrals and communications were provided as well.   Side effects of the above medications were discussed in detail including but not limited to applicable black box warning and teratogenicity as appropriate.  For patients with seizures, I discussed risk of SUDEP with patient and advised that SUDEP risk can be minimized with good seizure control through medication compliance and other measures. Patient was advised to bring previous records to my office, including CD of imaging, when applicable.

## 2024-02-08 NOTE — DATA REVIEWED
[de-identified] : No acute infarct, mass effect, or other evidence for acute intracranial pathology.

## 2024-02-09 PROCEDURE — 77014: CPT

## 2024-02-09 PROCEDURE — G6015: CPT

## 2024-02-11 NOTE — HISTORY OF PRESENT ILLNESS
[FreeTextEntry1] : 54 y/o male with Parkinson disease, GERD, HLD, [place] 3+4 prostate adenocarcinoma presents to discuss radiation treatment options for prostate cancer.  Initially Pt presented with hematuria at visit with PCP in 10/2023. 10/2023 -- MRI showed prostate vol 34 cc. No SVI, No LAD. PI- RADS 4 11/2023 -- PSA 4.72 11/15/2023 -- biopsy showed prostate adenocarcinoma. [place] 3+4 in 1 core, 50% of tissue involved. Gaur[place] 3+3 in 1 core , 5% involvement. Total 13 cores biopsied.  12/6/2023 Consultation. Nocturia 2-3 times. No hematuria, abd pain, fever.   2/8/2024 OTV. 21/28 fractions of radiation to prostate completed. c/o burning sensation during urination and urinary hesitancy, on flomax QHS for now. Rx flomax given. No abd pain, fever, hematuria. Bowel movement is okay. Pt saw neurologist for Parkinson disease.

## 2024-02-11 NOTE — DISEASE MANAGEMENT
[Biopsy with Fusion] : Patient had a biopsy with fusion on [7(3+4)] : Template Biopsy [place] Score: 7(3+4) [4] : 4 [Clinical] : TNM Stage: c [II] : II [BiopsyDate] : 11/2023 [TotalPositiveCores] : 2 [MeasuredProstateVolume] : 34 [TotalCores] : 13 [MaxCoreInvolvement] : 50 [TTNM] : x [MTNM] : x [NTNM] : x

## 2024-02-11 NOTE — REVIEW OF SYSTEMS
[Negative] : Allergic/Immunologic [Anal Pain: Grade 0] : Anal Pain: Grade 0 [Constipation: Grade 0] : Constipation: Grade 0 [Diarrhea: Grade 0] : Diarrhea: Grade 0 [Dyspepsia: Grade 0] : Dyspepsia: Grade 0 [Dysphagia: Grade 0] : Dysphagia: Grade 0 [Esophagitis: Grade 0] : Esophagitis: Grade 0 [Fecal Incontinence: Grade 0] : Fecal Incontinence: Grade 0 [Gastroparesis: Grade 0] : Gastroparesis: Grade 0 [Nausea: Grade 0] : Nausea: Grade 0 [Proctitis: Grade 0] : Proctitis: Grade 0 [Rectal Pain: Grade 0] : Rectal Pain: Grade 0 [Small Intestinal Obstruction: Grade 0] : Small Intestinal Obstruction: Grade 0 [Vomiting: Grade 0] : Vomiting: Grade 0 [Hematuria: Grade 0] : Hematuria: Grade 0 [Urinary Incontinence: Grade 0] : Urinary Incontinence: Grade 0  [Urinary Retention: Grade 1 - Urinary, suprapubic or intermittent catheter placement not indicated; able to void with some residual] : Urinary Retention: Grade 1 - Urinary, suprapubic or intermittent catheter placement not indicated; able to void with some residual [Urinary Tract Pain: Grade 1 - Mild pain] : Urinary Tract Pain: Grade 1 - Mild pain [Urinary Urgency: Grade 0] : Urinary Urgency: Grade 0 [Urinary Frequency: Grade 1 - Present] : Urinary Frequency: Grade 1 - Present [de-identified] : Parkinson disease [FreeTextEntry5] : FARAZ [FreeTextEntry7] : GERD

## 2024-02-11 NOTE — REVIEW OF SYSTEMS
[Negative] : Allergic/Immunologic [Anal Pain: Grade 0] : Anal Pain: Grade 0 [Constipation: Grade 0] : Constipation: Grade 0 [Diarrhea: Grade 0] : Diarrhea: Grade 0 [Dyspepsia: Grade 0] : Dyspepsia: Grade 0 [Dysphagia: Grade 0] : Dysphagia: Grade 0 [Esophagitis: Grade 0] : Esophagitis: Grade 0 [Fecal Incontinence: Grade 0] : Fecal Incontinence: Grade 0 [Gastroparesis: Grade 0] : Gastroparesis: Grade 0 [Nausea: Grade 0] : Nausea: Grade 0 [Proctitis: Grade 0] : Proctitis: Grade 0 [Rectal Pain: Grade 0] : Rectal Pain: Grade 0 [Small Intestinal Obstruction: Grade 0] : Small Intestinal Obstruction: Grade 0 [Vomiting: Grade 0] : Vomiting: Grade 0 [Hematuria: Grade 0] : Hematuria: Grade 0 [Urinary Incontinence: Grade 0] : Urinary Incontinence: Grade 0  [Urinary Retention: Grade 1 - Urinary, suprapubic or intermittent catheter placement not indicated; able to void with some residual] : Urinary Retention: Grade 1 - Urinary, suprapubic or intermittent catheter placement not indicated; able to void with some residual [Urinary Tract Pain: Grade 1 - Mild pain] : Urinary Tract Pain: Grade 1 - Mild pain [Urinary Urgency: Grade 0] : Urinary Urgency: Grade 0 [Urinary Frequency: Grade 1 - Present] : Urinary Frequency: Grade 1 - Present [FreeTextEntry7] : GERD [FreeTextEntry5] : FARAZ [de-identified] : Parkinson disease

## 2024-02-11 NOTE — HISTORY OF PRESENT ILLNESS
[FreeTextEntry1] : 53 y/o male with Parkinson disease, GERD, HLD, [place] 3+4 prostate adenocarcinoma presents to discuss radiation treatment options for prostate cancer.  Initially Pt presented with hematuria at visit with PCP in 10/2023. 10/2023 -- MRI showed prostate vol 34 cc. No SVI, No LAD. PI- RADS 4 11/2023 -- PSA 4.72 11/15/2023 -- biopsy showed prostate adenocarcinoma. [place] 3+4 in 1 core, 50% of tissue involved. Gaurav 3+3 in 1 core , 5% involvement. Total 13 cores biopsied.  12/6/2023 Consultation. Nocturia 2-3 times. No hematuria, abd pain, fever.   1/31/2024 OTV. 17/28 fractions of radiation to prostate completed. c/o burning sensation during urination and urinary hesitancy, on flomax from urologist, may consider to increase to BID. No abd pain, fever, hematuria. Bowel movement is okay. Pt will see neurologist for Parkinson disease.

## 2024-02-11 NOTE — DISEASE MANAGEMENT
[Biopsy with Fusion] : Patient had a biopsy with fusion on [7(3+4)] : Template Biopsy [place] Score: 7(3+4) [4] : 4 [Clinical] : TNM Stage: c [II] : II [BiopsyDate] : 11/2023 [MeasuredProstateVolume] : 34 [TotalCores] : 13 [MaxCoreInvolvement] : 50 [TotalPositiveCores] : 2 [TTNM] : x [NTNM] : x [MTNM] : x

## 2024-02-12 PROCEDURE — G6015: CPT

## 2024-02-12 PROCEDURE — 77014: CPT

## 2024-02-14 PROCEDURE — G6015: CPT

## 2024-02-14 PROCEDURE — 77014: CPT

## 2024-02-15 ENCOUNTER — NON-APPOINTMENT (OUTPATIENT)
Age: 54
End: 2024-02-15

## 2024-02-15 PROCEDURE — 77427 RADIATION TX MANAGEMENT X5: CPT

## 2024-02-15 PROCEDURE — 77014: CPT

## 2024-02-15 PROCEDURE — G6015: CPT

## 2024-02-15 PROCEDURE — 77336 RADIATION PHYSICS CONSULT: CPT

## 2024-02-16 PROCEDURE — G6015: CPT

## 2024-02-16 PROCEDURE — 77014: CPT

## 2024-02-19 NOTE — DISEASE MANAGEMENT
[Biopsy with Fusion] : Patient had a biopsy with fusion on [7(3+4)] : Template Biopsy [place] Score: 7(3+4) [4] : 4 [Clinical] : TNM Stage: c [II] : II [BiopsyDate] : 11/2023 [MeasuredProstateVolume] : 34 [TotalCores] : 13 [TotalPositiveCores] : 2 [MaxCoreInvolvement] : 50 [TTNM] : x [NTNM] : x [MTNM] : x

## 2024-02-19 NOTE — REVIEW OF SYSTEMS
[Negative] : Allergic/Immunologic [Anal Pain: Grade 0] : Anal Pain: Grade 0 [Constipation: Grade 0] : Constipation: Grade 0 [Diarrhea: Grade 0] : Diarrhea: Grade 0 [Dyspepsia: Grade 0] : Dyspepsia: Grade 0 [Dysphagia: Grade 0] : Dysphagia: Grade 0 [Esophagitis: Grade 0] : Esophagitis: Grade 0 [Fecal Incontinence: Grade 0] : Fecal Incontinence: Grade 0 [Gastroparesis: Grade 0] : Gastroparesis: Grade 0 [Nausea: Grade 0] : Nausea: Grade 0 [Proctitis: Grade 0] : Proctitis: Grade 0 [Rectal Pain: Grade 0] : Rectal Pain: Grade 0 [Small Intestinal Obstruction: Grade 0] : Small Intestinal Obstruction: Grade 0 [Vomiting: Grade 0] : Vomiting: Grade 0 [Hematuria: Grade 0] : Hematuria: Grade 0 [Urinary Incontinence: Grade 0] : Urinary Incontinence: Grade 0  [Urinary Retention: Grade 1 - Urinary, suprapubic or intermittent catheter placement not indicated; able to void with some residual] : Urinary Retention: Grade 1 - Urinary, suprapubic or intermittent catheter placement not indicated; able to void with some residual [Urinary Tract Pain: Grade 1 - Mild pain] : Urinary Tract Pain: Grade 1 - Mild pain [Urinary Urgency: Grade 0] : Urinary Urgency: Grade 0 [Urinary Frequency: Grade 1 - Present] : Urinary Frequency: Grade 1 - Present [FreeTextEntry5] : FARAZ [FreeTextEntry7] : GERD [de-identified] : Parkinson disease

## 2024-02-19 NOTE — HISTORY OF PRESENT ILLNESS
[FreeTextEntry1] : 52 y/o male with Parkinson disease, GERD, HLD, [place] 3+4 prostate adenocarcinoma presents to discuss radiation treatment options for prostate cancer.  Initially Pt presented with hematuria at visit with PCP in 10/2023. 10/2023 -- MRI showed prostate vol 34 cc. No SVI, No LAD. PI- RADS 4 11/2023 -- PSA 4.72 11/15/2023 -- biopsy showed prostate adenocarcinoma. [place] 3+4 in 1 core, 50% of tissue involved. G[place] 3+3 in 1 core , 5% involvement. Total 13 cores biopsied.  12/6/2023 Consultation. Nocturia 2-3 times. No hematuria, abd pain, fever.   2/15/2024 OTV. 25/28 fractions of radiation to prostate completed. c/o burning sensation during urination and urinary hesitancy, on flomax QHS for now. Rx refill flomax given. No abd pain, fever, hematuria. Bowel movement is okay. Pt saw neurologist for Parkinson disease.

## 2024-02-20 PROCEDURE — G6015: CPT

## 2024-02-20 PROCEDURE — 77014: CPT

## 2024-02-21 ENCOUNTER — NON-APPOINTMENT (OUTPATIENT)
Age: 54
End: 2024-02-21

## 2024-02-22 PROCEDURE — 77427 RADIATION TX MANAGEMENT X5: CPT

## 2024-02-22 PROCEDURE — 77336 RADIATION PHYSICS CONSULT: CPT

## 2024-02-22 PROCEDURE — 77014: CPT

## 2024-02-22 PROCEDURE — G6015: CPT

## 2024-02-22 NOTE — VITALS
[Least Pain Intensity: 0/10] : 0/10 [Maximal Pain Intensity: 0/10] : 0/10 [ECOG Performance Status: 1 - Restricted in physically strenuous activity but ambulatory and able to carry out work of a light or sedentary nature] : Performance Status: 1 - Restricted in physically strenuous activity but ambulatory and able to carry out work of a light or sedentary nature, e.g., light house work, office work [80: Normal activity with effort; some signs or symptoms of disease.] : 80: Normal activity with effort; some signs or symptoms of disease.  [6 - Distress Level] : Distress Level: 6

## 2024-02-25 NOTE — DISEASE MANAGEMENT
[Biopsy with Fusion] : Patient had a biopsy with fusion on [7(3+4)] : Template Biopsy [place] Score: 7(3+4) [4] : 4 [Clinical] : TNM Stage: c [II] : II [BiopsyDate] : 11/2023 [TotalPositiveCores] : 2 [MeasuredProstateVolume] : 34 [TotalCores] : 13 [MaxCoreInvolvement] : 50 [TTNM] : x [MTNM] : x [NTNM] : x

## 2024-02-25 NOTE — HISTORY OF PRESENT ILLNESS
[FreeTextEntry1] : 52 y/o male with Parkinson disease, GERD, HLD, [place] 3+4 prostate adenocarcinoma presents to discuss radiation treatment options for prostate cancer.  Initially Pt presented with hematuria at visit with PCP in 10/2023. 10/2023 -- MRI showed prostate vol 34 cc. No SVI, No LAD. PI- RADS 4 11/2023 -- PSA 4.72 11/15/2023 -- biopsy showed prostate adenocarcinoma. [place] 3+4 in 1 core, 50% of tissue involved. G[place] 3+3 in 1 core , 5% involvement. Total 13 cores biopsied.  12/6/2023 Consultation. Nocturia 2-3 times. No hematuria, abd pain, fever.   2/22/2024 OTV. 28/28 fractions of radiation to prostate completed. c/o burning sensation during urination and urinary hesitancy, on flomax QHS for now. Also c/o mild erectile dysfunction.  No abd pain, fever, hematuria. Bowel movement is okay. Pt saw neurologist for Parkinson disease.

## 2024-02-25 NOTE — REVIEW OF SYSTEMS
[Negative] : Allergic/Immunologic [Anal Pain: Grade 0] : Anal Pain: Grade 0 [Diarrhea: Grade 0] : Diarrhea: Grade 0 [Constipation: Grade 0] : Constipation: Grade 0 [Dyspepsia: Grade 0] : Dyspepsia: Grade 0 [Esophagitis: Grade 0] : Esophagitis: Grade 0 [Dysphagia: Grade 0] : Dysphagia: Grade 0 [Fecal Incontinence: Grade 0] : Fecal Incontinence: Grade 0 [Gastroparesis: Grade 0] : Gastroparesis: Grade 0 [Nausea: Grade 0] : Nausea: Grade 0 [Rectal Pain: Grade 0] : Rectal Pain: Grade 0 [Proctitis: Grade 0] : Proctitis: Grade 0 [Small Intestinal Obstruction: Grade 0] : Small Intestinal Obstruction: Grade 0 [Vomiting: Grade 0] : Vomiting: Grade 0 [Urinary Incontinence: Grade 0] : Urinary Incontinence: Grade 0  [Hematuria: Grade 0] : Hematuria: Grade 0 [Urinary Urgency: Grade 0] : Urinary Urgency: Grade 0 [Urinary Retention: Grade 1 - Urinary, suprapubic or intermittent catheter placement not indicated; able to void with some residual] : Urinary Retention: Grade 1 - Urinary, suprapubic or intermittent catheter placement not indicated; able to void with some residual [Urinary Tract Pain: Grade 1 - Mild pain] : Urinary Tract Pain: Grade 1 - Mild pain [Urinary Frequency: Grade 1 - Present] : Urinary Frequency: Grade 1 - Present [Erectile Dysfunction: Grade 2 - Decrease in erectile function (frequency/rigidity of erections), erectile intervention indicated, (e.g., medication or mechanical devices such as penile pump)] : Erectile Dysfunction: Grade 2 - Decrease in erectile function (frequency/rigidity of erections), erectile intervention indicated, (e.g., medication or mechanical devices such as penile pump) [de-identified] : Parkinson disease [FreeTextEntry7] : GERD [FreeTextEntry5] : FARAZ

## 2024-03-14 ENCOUNTER — APPOINTMENT (OUTPATIENT)
Dept: NEUROLOGY | Facility: CLINIC | Age: 54
End: 2024-03-14
Payer: MEDICAID

## 2024-03-14 VITALS
SYSTOLIC BLOOD PRESSURE: 103 MMHG | DIASTOLIC BLOOD PRESSURE: 68 MMHG | WEIGHT: 141 LBS | HEIGHT: 60 IN | BODY MASS INDEX: 27.68 KG/M2 | HEART RATE: 70 BPM

## 2024-03-14 PROCEDURE — 99214 OFFICE O/P EST MOD 30 MIN: CPT

## 2024-03-14 PROCEDURE — G2211 COMPLEX E/M VISIT ADD ON: CPT | Mod: NC,1L

## 2024-03-14 RX ORDER — POLYETHYLENE GLYCOL 3350 17 G/17G
17 POWDER, FOR SOLUTION ORAL DAILY
Qty: 30 | Refills: 11 | Status: ACTIVE | COMMUNITY
Start: 2024-03-14 | End: 1900-01-01

## 2024-03-14 RX ORDER — ENTACAPONE 200 MG/1
200 TABLET, FILM COATED ORAL
Qty: 450 | Refills: 3 | Status: COMPLETED | COMMUNITY
Start: 2022-10-04 | End: 2024-03-14

## 2024-03-14 NOTE — DISCUSSION/SUMMARY
[FreeTextEntry1] : PD x 5years with motor fluctuation and LID and medication wearing off every 2 hours  cannot take amantadine or gocovri due to history of glaucoma. daily persistant headache - chronic   Patient was counseled on the following recommendations:  - Sinemet  2 tab every 3 hours along with Rytary 145 for each dose  - Stop entacapone -Start Mirtazapine 15mg at bedtime -Stop Ambien - take MiraLAX every day to avoid constipation - refer to HA specialist for consult -Follow-up in 3 months  Patient staffed with attending Dr.Ramdhani June Bauer MD Movement Disorder fellow

## 2024-03-14 NOTE — PHYSICAL EXAM
[FreeTextEntry1] : There is mild masking.  Speech is 2+.  EOMI.  Tremor is absent. There is 1+ R>L bradykinesia with trace cogwheeling. Tremor absent. Has right leg dystonia when walking. Dyskinesia in the rt side noted This disappears when he walks backwards.  Pull test negative

## 2024-03-14 NOTE — HISTORY OF PRESENT ILLNESS
[FreeTextEntry1] : Patient here with wife PD since 5 years since last visit, when they tried Sinemet with entacapone that did not help with the stiffness or slowness, but when he tried it with rytary he feels better with the movements. Denies any falls wife states morning are better that afternoon. medication kicks 40 min to 1 hour last for 2 hours complains of numbness in the lower face without any pain, episode mainly happen in the afternoon and lasts till the night complains of headache, starting at the afternoon lasting for the entire day MRI brain 7/2021 - (report) normal   Nonmotor:  sleep is fragmented. sleeps abour3-4 hours, takes Ambien 5mg able to fall asleep with the medication only for 4 hours +constipation - severe  BM every other day. Uses miralax prn  No cognitive faculties exercises sometimes  experiences occasional depression Drooling at night  Meds sinemet  2 tabs  every 3  and half hour 6-:9:30-1-4:30-8:30 entacapone 200mg 1 tab qid rytary 95 one capsule QID ambien 5mg qhs prn finasteride 5mg  protonix   Prior  selegiline mirapex neupro - not covered trazodone

## 2024-04-23 ENCOUNTER — APPOINTMENT (OUTPATIENT)
Dept: UROLOGY | Facility: CLINIC | Age: 54
End: 2024-04-23
Payer: MEDICAID

## 2024-04-23 VITALS
OXYGEN SATURATION: 97 % | WEIGHT: 141 LBS | BODY MASS INDEX: 27.68 KG/M2 | TEMPERATURE: 98 F | RESPIRATION RATE: 16 BRPM | HEIGHT: 60 IN | DIASTOLIC BLOOD PRESSURE: 60 MMHG | SYSTOLIC BLOOD PRESSURE: 94 MMHG | HEART RATE: 62 BPM

## 2024-04-23 DIAGNOSIS — Z85.46 PERSONAL HISTORY OF MALIGNANT NEOPLASM OF PROSTATE: ICD-10-CM

## 2024-04-23 DIAGNOSIS — N40.1 BENIGN PROSTATIC HYPERPLASIA WITH LOWER URINARY TRACT SYMPMS: ICD-10-CM

## 2024-04-23 DIAGNOSIS — N13.8 BENIGN PROSTATIC HYPERPLASIA WITH LOWER URINARY TRACT SYMPMS: ICD-10-CM

## 2024-04-23 DIAGNOSIS — C61 MALIGNANT NEOPLASM OF PROSTATE: ICD-10-CM

## 2024-04-23 DIAGNOSIS — Z86.69 PERSONAL HISTORY OF OTHER DISEASES OF THE NERVOUS SYSTEM AND SENSE ORGANS: ICD-10-CM

## 2024-04-23 DIAGNOSIS — Z92.3 PERSONAL HISTORY OF IRRADIATION: ICD-10-CM

## 2024-04-23 PROCEDURE — G2211 COMPLEX E/M VISIT ADD ON: CPT | Mod: NC,1L

## 2024-04-23 PROCEDURE — 99214 OFFICE O/P EST MOD 30 MIN: CPT

## 2024-04-23 RX ORDER — TAMSULOSIN HYDROCHLORIDE 0.4 MG/1
0.4 CAPSULE ORAL
Qty: 90 | Refills: 1 | Status: ACTIVE | COMMUNITY
Start: 2023-09-19 | End: 1900-01-01

## 2024-04-23 NOTE — ASSESSMENT
[FreeTextEntry1] : Mr. Canales is a very pleasant 53 year old man here today for history of prostate cancer s/p radiation. He reports feeling well since he was here last. He continues to take tamsulosin daily and is happy with his urination at this time. He denies any hematuria or dysuria. Prebiopsy PSA 4.72; repeat PSA today -MRI PI-RADS 4 in the setting of a 34 cc prostate, PSA density 0.14 Prostate biopsy demonstrates [place] group grade 2 prostate cancer at the site of the target lesion and [place] group grade 1 prostate cancer involving 5% of another core. Continue tamsulosin-refill sent to the pharmacy RTO in 6 months.  Patient is being seen today for evaluation and management of a chronic and longitudinal ongoing condition and I am of the primary treating physician

## 2024-04-23 NOTE — HISTORY OF PRESENT ILLNESS
[None] : None [FreeTextEntry1] : Mr. Canales is a very pleasant 53 year old man here today for history of prostate cancer s/p radiation. He reports feeling well since he was here last. He continues to take tamsulosin daily and is happy with his urinary symptoms at this time. He denies any hematuria or dysuria.  No other complaints. Cantharidin Counseling: Calcipotriene Counseling:  I discussed with the patient the risks of calcipotriene including but not limited to erythema, scaling, itching, and irritation.

## 2024-04-23 NOTE — PHYSICAL EXAM
[Normal Appearance] : normal appearance [Well Groomed] : well groomed [General Appearance - In No Acute Distress] : no acute distress [Edema] : no peripheral edema [Respiration, Rhythm And Depth] : normal respiratory rhythm and effort [Exaggerated Use Of Accessory Muscles For Inspiration] : no accessory muscle use [Abdomen Soft] : soft [Abdomen Tenderness] : non-tender [Costovertebral Angle Tenderness] : no ~M costovertebral angle tenderness [Normal Station and Gait] : the gait and station were normal for the patient's age [] : no rash [No Focal Deficits] : no focal deficits [Oriented To Time, Place, And Person] : oriented to person, place, and time [Affect] : the affect was normal [Mood] : the mood was normal [No Palpable Adenopathy] : no palpable adenopathy yes...

## 2024-04-24 LAB — PSA SERPL-MCNC: 4.64 NG/ML

## 2024-04-30 ENCOUNTER — APPOINTMENT (OUTPATIENT)
Dept: RADIATION ONCOLOGY | Facility: CLINIC | Age: 54
End: 2024-04-30
Payer: MEDICAID

## 2024-04-30 VITALS — HEIGHT: 60 IN | RESPIRATION RATE: 16 BRPM | WEIGHT: 147 LBS | BODY MASS INDEX: 28.86 KG/M2

## 2024-04-30 PROCEDURE — 99024 POSTOP FOLLOW-UP VISIT: CPT

## 2024-04-30 RX ORDER — SILDENAFIL 50 MG/1
50 TABLET ORAL
Qty: 5 | Refills: 3 | Status: ACTIVE | COMMUNITY
Start: 2024-04-30 | End: 1900-01-01

## 2024-05-01 ENCOUNTER — APPOINTMENT (OUTPATIENT)
Dept: ENDOCRINOLOGY | Facility: CLINIC | Age: 54
End: 2024-05-01

## 2024-05-07 NOTE — HISTORY OF PRESENT ILLNESS
[FreeTextEntry1] : 52 y/o male with Parkinson disease, GERD, HLD, [place] 3+4 prostate adenocarcinoma presents to discuss radiation treatment options for prostate cancer.  Initially Pt presented with hematuria at visit with PCP in 10/2023. 10/2023 -- MRI showed prostate vol 34 cc. No SVI, No LAD. PI- RADS 4 11/2023 -- PSA 4.72 11/15/2023 -- biopsy showed prostate adenocarcinoma. [place] 3+4 in 1 core, 50% of tissue involved. [place] 3+3 in 1 core , 5% involvement. Total 13 cores biopsied.  12/6/2023 Consultation. Nocturia 2-3 times. No hematuria, abd pain, fever.   2/22/2024 OTV. 28/28 fractions of radiation to prostate completed. c/o burning sensation during urination and urinary hesitancy, on flomax QHS for now. Also c/o mild erectile dysfunction.  No abd pain, fever, hematuria. Bowel movement is okay. Pt saw neurologist for Parkinson disease. 4/2024 -- PSA 4.64  4/30/2024 F/U. Pt completed 70 Gy / 28 Fx of radiation to prostate on 2/22/2024. On flomax. Requested viagra for ED. Rx given. Will see urologist again in 7/2024.

## 2024-05-07 NOTE — DISEASE MANAGEMENT
[Biopsy with Fusion] : Patient had a biopsy with fusion on [7(3+4)] : Template Biopsy [place] Score: 7(3+4) [4] : 4 [Clinical] : TNM Stage: c [II] : II [BiopsyDate] : 11/2023 [MeasuredProstateVolume] : 34 [TotalCores] : 13 [TotalPositiveCores] : 2 [MaxCoreInvolvement] : 50 [TTNM] : x [NTNM] : x [MTNM] : x

## 2024-05-07 NOTE — REVIEW OF SYSTEMS
[Negative] : Allergic/Immunologic [Anal Pain: Grade 0] : Anal Pain: Grade 0 [Constipation: Grade 0] : Constipation: Grade 0 [Diarrhea: Grade 0] : Diarrhea: Grade 0 [Dyspepsia: Grade 0] : Dyspepsia: Grade 0 [Dysphagia: Grade 0] : Dysphagia: Grade 0 [Esophagitis: Grade 0] : Esophagitis: Grade 0 [Fecal Incontinence: Grade 0] : Fecal Incontinence: Grade 0 [Gastroparesis: Grade 0] : Gastroparesis: Grade 0 [Nausea: Grade 0] : Nausea: Grade 0 [Proctitis: Grade 0] : Proctitis: Grade 0 [Rectal Pain: Grade 0] : Rectal Pain: Grade 0 [Small Intestinal Obstruction: Grade 0] : Small Intestinal Obstruction: Grade 0 [Vomiting: Grade 0] : Vomiting: Grade 0 [Hematuria: Grade 0] : Hematuria: Grade 0 [Urinary Incontinence: Grade 0] : Urinary Incontinence: Grade 0  [Urinary Retention: Grade 1 - Urinary, suprapubic or intermittent catheter placement not indicated; able to void with some residual] : Urinary Retention: Grade 1 - Urinary, suprapubic or intermittent catheter placement not indicated; able to void with some residual [Urinary Tract Pain: Grade 1 - Mild pain] : Urinary Tract Pain: Grade 1 - Mild pain [Urinary Urgency: Grade 0] : Urinary Urgency: Grade 0 [Urinary Frequency: Grade 1 - Present] : Urinary Frequency: Grade 1 - Present [Erectile Dysfunction: Grade 2 - Decrease in erectile function (frequency/rigidity of erections), erectile intervention indicated, (e.g., medication or mechanical devices such as penile pump)] : Erectile Dysfunction: Grade 2 - Decrease in erectile function (frequency/rigidity of erections), erectile intervention indicated, (e.g., medication or mechanical devices such as penile pump) [FreeTextEntry5] : FARAZ [FreeTextEntry7] : GERD [de-identified] : Parkinson disease

## 2024-05-27 ENCOUNTER — EMERGENCY (EMERGENCY)
Facility: HOSPITAL | Age: 54
LOS: 1 days | Discharge: HOME | End: 2024-05-27
Attending: EMERGENCY MEDICINE | Admitting: EMERGENCY MEDICINE
Payer: MEDICAID

## 2024-05-27 VITALS
DIASTOLIC BLOOD PRESSURE: 63 MMHG | RESPIRATION RATE: 18 BRPM | TEMPERATURE: 98 F | HEART RATE: 72 BPM | OXYGEN SATURATION: 100 % | SYSTOLIC BLOOD PRESSURE: 97 MMHG

## 2024-05-27 LAB
ADD ON TEST-SPECIMEN IN LAB: SIGNIFICANT CHANGE UP
ALBUMIN SERPL ELPH-MCNC: 4.3 G/DL — SIGNIFICANT CHANGE UP (ref 3.3–5)
ALP SERPL-CCNC: 71 U/L — SIGNIFICANT CHANGE UP (ref 40–120)
ALT FLD-CCNC: 5 U/L — SIGNIFICANT CHANGE UP (ref 4–41)
ANION GAP SERPL CALC-SCNC: 13 MMOL/L — SIGNIFICANT CHANGE UP (ref 7–14)
APTT BLD: 34.4 SEC — SIGNIFICANT CHANGE UP (ref 24.5–35.6)
AST SERPL-CCNC: 24 U/L — SIGNIFICANT CHANGE UP (ref 4–40)
BASE EXCESS BLDV CALC-SCNC: 1.2 MMOL/L — SIGNIFICANT CHANGE UP (ref -2–3)
BASOPHILS # BLD AUTO: 0.02 K/UL — SIGNIFICANT CHANGE UP (ref 0–0.2)
BASOPHILS NFR BLD AUTO: 0.4 % — SIGNIFICANT CHANGE UP (ref 0–2)
BILIRUB SERPL-MCNC: 0.3 MG/DL — SIGNIFICANT CHANGE UP (ref 0.2–1.2)
BLOOD GAS VENOUS COMPREHENSIVE RESULT: SIGNIFICANT CHANGE UP
BUN SERPL-MCNC: 11 MG/DL — SIGNIFICANT CHANGE UP (ref 7–23)
CALCIUM SERPL-MCNC: 9.3 MG/DL — SIGNIFICANT CHANGE UP (ref 8.4–10.5)
CHLORIDE BLDV-SCNC: 101 MMOL/L — SIGNIFICANT CHANGE UP (ref 96–108)
CHLORIDE SERPL-SCNC: 102 MMOL/L — SIGNIFICANT CHANGE UP (ref 98–107)
CO2 BLDV-SCNC: 27.3 MMOL/L — HIGH (ref 22–26)
CO2 SERPL-SCNC: 23 MMOL/L — SIGNIFICANT CHANGE UP (ref 22–31)
CREAT SERPL-MCNC: 0.69 MG/DL — SIGNIFICANT CHANGE UP (ref 0.5–1.3)
EGFR: 112 ML/MIN/1.73M2 — SIGNIFICANT CHANGE UP
EOSINOPHIL # BLD AUTO: 0.18 K/UL — SIGNIFICANT CHANGE UP (ref 0–0.5)
EOSINOPHIL NFR BLD AUTO: 3.4 % — SIGNIFICANT CHANGE UP (ref 0–6)
GAS PNL BLDV: 135 MMOL/L — LOW (ref 136–145)
GAS PNL BLDV: SIGNIFICANT CHANGE UP
GLUCOSE BLDV-MCNC: 98 MG/DL — SIGNIFICANT CHANGE UP (ref 70–99)
GLUCOSE SERPL-MCNC: 106 MG/DL — HIGH (ref 70–99)
HCO3 BLDV-SCNC: 26 MMOL/L — SIGNIFICANT CHANGE UP (ref 22–29)
HCT VFR BLD CALC: 36.3 % — LOW (ref 39–50)
HCT VFR BLDA CALC: 38 % — LOW (ref 39–51)
HGB BLD CALC-MCNC: 12.8 G/DL — SIGNIFICANT CHANGE UP (ref 12.6–17.4)
HGB BLD-MCNC: 12.7 G/DL — LOW (ref 13–17)
IANC: 3.11 K/UL — SIGNIFICANT CHANGE UP (ref 1.8–7.4)
IMM GRANULOCYTES NFR BLD AUTO: 0.2 % — SIGNIFICANT CHANGE UP (ref 0–0.9)
INR BLD: 0.97 RATIO — SIGNIFICANT CHANGE UP (ref 0.85–1.18)
LACTATE BLDV-MCNC: 2.1 MMOL/L — HIGH (ref 0.5–2)
LYMPHOCYTES # BLD AUTO: 1.56 K/UL — SIGNIFICANT CHANGE UP (ref 1–3.3)
LYMPHOCYTES # BLD AUTO: 29.3 % — SIGNIFICANT CHANGE UP (ref 13–44)
MCHC RBC-ENTMCNC: 30.9 PG — SIGNIFICANT CHANGE UP (ref 27–34)
MCHC RBC-ENTMCNC: 35 GM/DL — SIGNIFICANT CHANGE UP (ref 32–36)
MCV RBC AUTO: 88.3 FL — SIGNIFICANT CHANGE UP (ref 80–100)
MONOCYTES # BLD AUTO: 0.44 K/UL — SIGNIFICANT CHANGE UP (ref 0–0.9)
MONOCYTES NFR BLD AUTO: 8.3 % — SIGNIFICANT CHANGE UP (ref 2–14)
NEUTROPHILS # BLD AUTO: 3.11 K/UL — SIGNIFICANT CHANGE UP (ref 1.8–7.4)
NEUTROPHILS NFR BLD AUTO: 58.4 % — SIGNIFICANT CHANGE UP (ref 43–77)
NRBC # BLD: 0 /100 WBCS — SIGNIFICANT CHANGE UP (ref 0–0)
NRBC # FLD: 0 K/UL — SIGNIFICANT CHANGE UP (ref 0–0)
PCO2 BLDV: 41 MMHG — LOW (ref 42–55)
PH BLDV: 7.41 — SIGNIFICANT CHANGE UP (ref 7.32–7.43)
PLATELET # BLD AUTO: 201 K/UL — SIGNIFICANT CHANGE UP (ref 150–400)
PO2 BLDV: 29 MMHG — SIGNIFICANT CHANGE UP (ref 25–45)
POTASSIUM BLDV-SCNC: 4 MMOL/L — SIGNIFICANT CHANGE UP (ref 3.5–5.1)
POTASSIUM SERPL-MCNC: 4 MMOL/L — SIGNIFICANT CHANGE UP (ref 3.5–5.3)
POTASSIUM SERPL-SCNC: 4 MMOL/L — SIGNIFICANT CHANGE UP (ref 3.5–5.3)
PROT SERPL-MCNC: 7.4 G/DL — SIGNIFICANT CHANGE UP (ref 6–8.3)
PROTHROM AB SERPL-ACNC: 10.9 SEC — SIGNIFICANT CHANGE UP (ref 9.5–13)
RBC # BLD: 4.11 M/UL — LOW (ref 4.2–5.8)
RBC # FLD: 12.3 % — SIGNIFICANT CHANGE UP (ref 10.3–14.5)
SAO2 % BLDV: 48.1 % — LOW (ref 67–88)
SODIUM SERPL-SCNC: 138 MMOL/L — SIGNIFICANT CHANGE UP (ref 135–145)
TROPONIN T, HIGH SENSITIVITY RESULT: 10 NG/L — SIGNIFICANT CHANGE UP
TROPONIN T, HIGH SENSITIVITY RESULT: 8 NG/L — SIGNIFICANT CHANGE UP
WBC # BLD: 5.32 K/UL — SIGNIFICANT CHANGE UP (ref 3.8–10.5)
WBC # FLD AUTO: 5.32 K/UL — SIGNIFICANT CHANGE UP (ref 3.8–10.5)

## 2024-05-27 PROCEDURE — 93010 ELECTROCARDIOGRAM REPORT: CPT

## 2024-05-27 PROCEDURE — 99223 1ST HOSP IP/OBS HIGH 75: CPT

## 2024-05-27 PROCEDURE — 70496 CT ANGIOGRAPHY HEAD: CPT | Mod: 26,MC

## 2024-05-27 PROCEDURE — 71275 CT ANGIOGRAPHY CHEST: CPT | Mod: 26,MC

## 2024-05-27 PROCEDURE — 70498 CT ANGIOGRAPHY NECK: CPT | Mod: 26,MC

## 2024-05-27 PROCEDURE — 71045 X-RAY EXAM CHEST 1 VIEW: CPT | Mod: 26

## 2024-05-27 PROCEDURE — 70450 CT HEAD/BRAIN W/O DYE: CPT | Mod: 26,MC,59

## 2024-05-27 RX ORDER — FAMOTIDINE 10 MG/ML
20 INJECTION INTRAVENOUS
Refills: 0 | Status: DISCONTINUED | OUTPATIENT
Start: 2024-05-27 | End: 2024-05-31

## 2024-05-27 RX ORDER — SODIUM CHLORIDE 9 MG/ML
1000 INJECTION INTRAMUSCULAR; INTRAVENOUS; SUBCUTANEOUS ONCE
Refills: 0 | Status: COMPLETED | OUTPATIENT
Start: 2024-05-27 | End: 2024-05-27

## 2024-05-27 RX ORDER — TAMSULOSIN HYDROCHLORIDE 0.4 MG/1
0.4 CAPSULE ORAL DAILY
Refills: 0 | Status: DISCONTINUED | OUTPATIENT
Start: 2024-05-27 | End: 2024-05-31

## 2024-05-27 RX ORDER — ATORVASTATIN CALCIUM 80 MG/1
10 TABLET, FILM COATED ORAL DAILY
Refills: 0 | Status: DISCONTINUED | OUTPATIENT
Start: 2024-05-27 | End: 2024-05-31

## 2024-05-27 RX ORDER — CARBIDOPA AND LEVODOPA 25; 100 MG/1; MG/1
2 TABLET ORAL
Refills: 0 | Status: DISCONTINUED | OUTPATIENT
Start: 2024-05-27 | End: 2024-05-31

## 2024-05-27 RX ORDER — ACETAMINOPHEN 500 MG
650 TABLET ORAL EVERY 6 HOURS
Refills: 0 | Status: DISCONTINUED | OUTPATIENT
Start: 2024-05-27 | End: 2024-05-31

## 2024-05-27 RX ORDER — MIRTAZAPINE 45 MG/1
15 TABLET, ORALLY DISINTEGRATING ORAL DAILY
Refills: 0 | Status: DISCONTINUED | OUTPATIENT
Start: 2024-05-27 | End: 2024-05-31

## 2024-05-27 RX ADMIN — SODIUM CHLORIDE 1000 MILLILITER(S): 9 INJECTION INTRAMUSCULAR; INTRAVENOUS; SUBCUTANEOUS at 18:50

## 2024-05-27 NOTE — ED ADULT NURSE REASSESSMENT NOTE - NS ED NURSE REASSESS COMMENT FT1
Report given to SHELDON Appiah, pt transported to CDU with EDT Adrianna. Pt a&ox4, ambulatory, normal sinus rhythm on cardiac monitor, denies CP, SOB, or dyspnea at this time. Airway is patent, speaking in clear and coherent sentences. Respirations are even and unlabored, no signs of respiratory distress.

## 2024-05-27 NOTE — ED ADULT TRIAGE NOTE - CHIEF COMPLAINT QUOTE
Pt c/o shortness of breath beginning today. reports headache and dizziness beginning yesterday. denies any pain anywhere. no blurry vision, numbness/ weakness / tingling. pt appears uncomfortable. unable to sit still. hx. Parkinson's

## 2024-05-27 NOTE — ED ADULT NURSE NOTE - PAIN RATING/NUMBER SCALE (0-10): ACTIVITY
Pt called again and wanted Dr. Minor to call her regarding her lab results.  
Spoke with pt in regards to her pap smear. Results given to pt. Also informed pt that HPV is still pending and we should have results by Friday according to [facility].  Pt state that she is frustrated with the fact that it took this long to get results informed pt that her specimen was sent to [facility] which is a preferred provider for her insurance. Informed pt that her specimen was sent to [facility] and [facility] where all specimens are sent.  Also informed her that it can take longer than a week if the specimen is abnormal. Which in the case it was. Pt state that she was told that her results would be back in one week and that now it has been about 2 weeks.  Informed pt that due to the abnormal pap results they do take longer than normal. Pt still upset after all information. Pt states that she has no other questions or concerns.   
3 (mild pain)

## 2024-05-27 NOTE — ED ADULT NURSE NOTE - OBJECTIVE STATEMENT
Pt history of Parkinson's presents for multiple complaints. Pt endorses shortness of breath, headache and dizziness since yesterday, denies chest pain, NSR on cardiac monitor, spo2 98% room air, breathing even and unlabored at rest, abd soft, non-tender, non-distended, skin cool dry and intact, ivl placed, labs collected and sent, will continue to monitor.

## 2024-05-27 NOTE — ED PROVIDER NOTE - PHYSICAL EXAMINATION
General:   Head: Atraumatic, normocephalic  Eyes: EOM grossly in tact, no scleral icterus, no discharge  ENT: moist mucous membranes  Neurology: A&Ox 2, PERRL, CN grossly intact. 5/5 strength and normal sensation throughout all four extremities. No pronator drift. no facial asymmetry.   Respiratory: CTAB, no wheezing, normal respiratory effort  CV: RRR, good s1/s2, no S3, Extremities warm and well perfused  Abdominal: Soft, non-distended, non-tender, no masses  Extremities: No edema, no deformities  Skin: warm and dry. No rashes General: pt uncooperative, intermittently no following commands, preoccupied  Head: Atraumatic, normocephalic  Eyes: EOM grossly in tact, no scleral icterus, no discharge  ENT: moist mucous membranes  Neurology: A&Ox 2, PERRL, CN grossly intact. 5/5 strength and normal sensation throughout all four extremities. No pronator drift. no facial asymmetry.   Respiratory: CTAB, no wheezing, normal respiratory effort  CV: RRR, good s1/s2, no S3, Extremities warm and well perfused  Abdominal: Soft, non-distended, non-tender, no masses  Extremities: No edema, no deformities  Skin: warm and dry. No rashes

## 2024-05-27 NOTE — ED PROVIDER NOTE - ATTENDING CONTRIBUTION TO CARE
Her/She
Attending Statement: I have personally seen and examined this patient. I have fully participated in the care of this patient. I have reviewed all pertinent clinical information, including history physical exam, plan and the Resident's note and agree except as noted  53-year-old male history of Parkinson disease, hypertension, high cholesterol, GERD, prostate CA status post radiation therapy from home with wife at bedside chief complaints chest pain and shortness of breath for the last 3 days.  Wife states that he has been complaining of intermittent midsternal chest tightness, nonexertional.  Associated with shortness of breath.  States that today though things "are worse".  Patient was complaining of "spinning dizzy sensation for the last 3 hours" and "not talking for the last hour" prompted ER visit.  With last known normal approximately 4 hours ago.  On arrival patient very uncomfortable.  Appears short of breath.  Minimally answering questions but following commands.  No sign of trauma.  No obvious facial asymmetry.  Moving all extremities.  Normal sensation throughout.  Appears uncomfortable moving around the bed.  But not hypoxic.  No grimace to palpation of abdomen.  History primarily obtained via wife.  Given acute onset of symptoms code stroke was called.  Neurology at bedside.  Taken to CAT scan for emergent CT head CT angio head and neck and CT chest rule out PE.  Labs ordered EKG done.  While neurologist talking to the patient wife now she states that he has had intermittent dizziness for 3 days now 3 hours.  He has had similar symptoms in the past.  However this is the worst has been.  He is primarily complaining of chest pain and shortness of breath prior to arrival prompting ER visit.  No focal deficits upon neurology's exam.  Will proceed with imaging and urology consult but canceled the code stroke given symptoms have been over 24 hours.  EKG sr no RONALDO.

## 2024-05-27 NOTE — ED CDU PROVIDER INITIAL DAY NOTE - OBJECTIVE STATEMENT
54 yo M w/ hx of HTN, HLD, GERD, parkinson's, presenting w/ c/o of shortness of breath and dizziness. He has been complaining fo this for 3 days. Pt also experiencing congestion and pain in his chest. Pt has been taking all his medications. While in ECG pt was looking all over, intermittently not answering tech so was brought to TRB. Pt's wife states for three days duration, patient has been having symptoms of chest discomfort, room spinning sensation. Two days prior to presentation, patient advocated shortness of breath, feeling like he was 'swallowing his teeth and tongue' lasting several hours. One day prior to presentation, patient had an episode of stiffness lasting several hours in duration. The patient advocated worsening shortness of breath and chest discomfort therefore presented to the ED for further workup.    CDU FUNMILAYO Montes: Agree with the above. In the ED, code stroke was initiated on arrival. Neuro evaluated the patient and CTs were performed. CTs were unremarkable for acute findings. Neuro cleared patient that they were not concerned that his symptoms were related to stroke. Labs results were wnl. Plan is for patient to be transferred to CDU for continuous cardiac monitoring, stress test, and tele doc eval in the am.

## 2024-05-27 NOTE — ED ADULT NURSE NOTE - NSFALLHARMRISKINTERV_ED_ALL_ED

## 2024-05-27 NOTE — ED PROVIDER NOTE - CLINICAL SUMMARY MEDICAL DECISION MAKING FREE TEXT BOX
52 yo M w/ hx of HTN, HLD, GERD, parkinson's, presenting w/ c/o of shortness of breath and dizziness. Differential diagnosis includes but is not limited to infection, ACS, CVA, FND, CHF, pleural effusions, viral illness. Pt w/o any concerning exam findings, but states he is in distress though no objective findings. Pt has a normal oxygen saturation, cxr normal, CT performed given reported dizziness, ECG non-ischemic. code stroke called awaiting neurology recommendations will likely require provicative testing.

## 2024-05-27 NOTE — ED PROVIDER NOTE - OBJECTIVE STATEMENT
Progress Notes by Annmarie Salmeron at 01/11/17 09:53 AM     Author:  Annmarie Salmeron Service:  (none) Author Type:  Physical Therapy Assistant     Filed:  01/11/17 11:37 AM Encounter Date:  1/11/2017 Status:  Addendum     :  Annmarie Salmeron (Physical Therapy Assistant)                  PHYSICAL THERAPY DAILY NOTE     DIAGNOSIS: Left total knee replacement   1. Status post total left knee replacement    2. Decreased range of motion (ROM) of knee    3. Bilateral leg weakness          INSURANCE ISSUES: HMO    ATTENDANCE: Patient has been seen for 1[CW1.1C]1[CW1.1M] visits between 12/8/2016 and[CW1.1C]  1/11/2017[CW1.1T].  Progress Summary due by 2/03/17.    SUBJECTIVE:[CW1.1C]  Patient reports he's just \"sore\" today  Had a bad day in general yesterday. Could have been the rainy weather.[CW1.1M]        PateiSurgical date: 11/01/16    OBJECTIVE:     Short Term Goals (to be achieved in 2 weeks  1. Patient will increase active knee range of motion to 0 to 115 degrees to allow patient to perform functional tasks  with minimal difficulty (progressing towards working on soft tissue restrictions with manual therapy and range of motion with therapeutic exercise 1/09/17)    Long Term Goals (to be achieved in 4 weeks  1. Patient will ascend and descend 4 steps x 3 in the clinic with good mechanics and safety to perform stairs in home/community[CW1.1C] -stepping up /down from a 3\" box-1/11/17[CW1.2M]  2. Patient to increase strength of the bilateral hip complexes to 5/5 throughout in order to complete daily activity (progressing towards working on strength with therapeutic exercise - see measurements 1/09/17)  3. Patient to perform a functional squat from a standard chair 10 times with good mechanics and good eccentric control, with/withtout the use of arms to indicate improved functional strength (working on strength with therapeutic exercise 1/09/17)  4. The patient to carry 10# in an upper extremity for 50 feet with/without an  assistive device to show improved functional strength (progressing towards working on strength with therapeutic exercise 1/09/17)      Range of Motion: measured in supine 1/03/17  Left knee (-4) to 105 degrees     Manual Muscle Test: 1/03/17  Hip flexion bilateral is 5/5  Hip abduction (assessed in standing) bilateral is 4/5    Palpation: 1/09/07  Tissue tightness softens with soft tissue mobilization.   Pain with pressure into extension and with passive knee flexion - tightness in both directions     Functional Assessment: 1/03/07  Gait: Ambulating with a straight cane today - slow, steady gait pattern.  Good step and stride length - good charlene     Stairs: Ascends/descends 4 steps x 3 in the clinic - step to pattern up with the right down with the left - use of left hand railing with both hands on railing ascending stairs.  Descending stairs one hand on railing    Functional Squats: the patient is demonstrating better mechanics and control with functional squatting x 10 from a standard chair with the use of arms.  The patient is able to do 10 functional squats from a standard chair with the use of bilateral arm rests.      Functional reporting:   Mobility: Walking and moving around  CJ - 20-39% Current status  Mobility: Walking and moving around  CI - 1-19% Projected Goal      TREATMENT TODAY:   Time in:[CW1.1C] 9:52[CW1.1M]   Time out:[CW1.1C]11:10[CW1.2M]    Modalities - Electrical Stimulation to decrease pain and reduce inflammation:  x 1 units - 10 minutes or 57210 (unattended non-Medicare) Interferential treatment 100% scan intensity as tolerated   Cold  pack to decrease pain and reduce inflammation:  39086 - 10 minutes -    Manual Therapy to increase joint mobility, improve circulation,  increase range of motion, decrease myofascial tightness and improve soft tissue and joint mobility:  97140 x 1 units -  1[CW1.1C]8[CW1.2M] minutes   Soft tissue mobilization to the left knee complex[CW1.1C]    Lacrosse ball and roller to ITB on L in R side lying[CW1.2M]    Therapeutic Exercise to increase range of motion, improve flexibility, increase strength and instruct in a home exercise program:  97110 x  1 units - 4[CW1.1C]1[CW1.2M]Minutes  (one on one with the patient for 15 minutes)      Passive range of motion in sitting to increase knee flexion     SCI FIT  bike x 5 minutes  Parallel bars - chair squats[CW1.1C] x 25[CW1.3M] 1 Airex  Parallel bars -  Step up/downs 3 inch box x 10  Parallel bars - side steps x 6 orange band  Seated hamstring curls x 30 green band[CW1.1C]  Standing[CW1.3M] heel raises x 35   Geomat -[CW1.1C]5[CW1.3M] x 10   Tandem balance 2 x 30 seconds   Marches x 20 - defer  3 way hip x 10 each - orange band   Lateral step ups 4 inch 3 x 10[CW1.1C]  Added:  Monster walks in bars-orange theraband x 3 laps  Lunges x 15  Walk on 2-1/2 foam rolls x 5[CW1.3M]      Home exercise program (last updated (12/8/2016): Patient issued written home exercise program.  Patient demonstrated exercises correctly and was instructed to call with questions.   Exercises to be done 2 times a day   3 way hip abduction 3 x 10  Bridges with band at knees 3 x 10  Supine hook lying hip external rotation x 30  Standing knee flexion x 30  Sit to stand from chair 3 x 10  Side steps 15 feet down and back x 3    ASSESSMENT/TREATMENT RESPONSE:    Patient's response to treatment:[CW1.1C] Tightness noted in L ITB today.[CW1.2M]    Functional improvement noted:[CW1.1C] Able to step up/down from 3\" step[CW1.2M]    PLAN:   Continue with the plan of care - continue with manual therapy, continue with therapeutic exercise, continue with modalities as needed     Therapist Signature:[CW1.1C]  Electronically Signed by:    Annmarie Salmeron , 1/11/2017[CW1.1T]              Revision History        User Key Date/Time User Provider Type Action    > [N/A] 01/11/17 11:37 AM Annmarie Salmeron Physical Therapy Assistant Addend     CW1.3 01/11/17 11:32 AM  Annmarie Salmeron Physical Therapy Assistant Sign     CW1.2 01/11/17 11:24 AM Annmarie Salmeron Physical Therapy Assistant      CW1.1 01/11/17 09:53 AM Annmarie Salmeron Physical Therapy Assistant     C - Copied, M - Manual, T - Template             54 yo M w/ hx of HTN, HLD, GERD, parkinson's, presenting w/ c/o of shortness of breath and dizziness. He has been complaining fo this for 3 days. Pt also experiencing congestion and pain in his chest. No 52 yo M w/ hx of HTN, HLD, GERD, parkinson's, presenting w/ c/o of shortness of breath and dizziness. He has been complaining fo this for 3 days. Pt also experiencing congestion and pain in his chest. Pt has been taking all his medications. While in ECG pt was looking all over, intermittently not answering tech so was brought to TRB. 54 yo M w/ hx of HTN, HLD, GERD, parkinson's, presenting w/ c/o of shortness of breath and dizziness. He has been complaining fo this for 3 days. Pt also experiencing congestion and pain in his chest. Pt has been taking all his medications. While in ECG pt was looking all over, intermittently not answering tech so was brought to TRB. Pt's wife states for three days duration, patient has been having symptoms of chest discomfort, room spinning sensation. Two days prior to presentation, patient advocated shortness of breath, feeling like he was 'swallowing his teeth and tongue' lasting several hours. One day prior to presentation, patient had an episode of stiffness lasting several hours in duration. The patient advocated worsening shortness of breath and chest discomfort therefore presented to the ED for further workup

## 2024-05-27 NOTE — CONSULT NOTE ADULT - SUBJECTIVE AND OBJECTIVE BOX
Neurology - Consult Note    Spectra: [phone] ([facility]), [phone] ([facility])    HPI:       Review of Systems:  INCOMPLETE   CONSTITUTIONAL: No fevers or chills  EYES AND ENT: No visual changes or no throat pain   NECK: No pain or stiffness  RESPIRATORY: No shortness of breath  CARDIOVASCULAR: No chest pain or palpitations  GASTROINTESTINAL: No nausea or vomiting   GENITOURINARY: No dysuria  NEUROLOGICAL: +As stated in HPI above  SKIN: No itching, burning, rashes, or lesions   All other review of systems is negative unless indicated above.    Allergies:  No Known Allergies      PMHx/PSHx/Family Hx: As above, otherwise see below   No pertinent past medical history    Parkinsons    GERD (gastroesophageal reflux disease)    Hyperlipidemia    Glaucoma    Constipation        Social Hx:  Never smoker; no current use of tobacco, alcohol, or illicit drugs  Lives with ***  Occupation ***  Baseline functional status is ***    Vitals:  T(C): 36.7 (05-27-24 @ 16:09), Max: 36.7 (05-27-24 @ 16:09)  HR: 72 (05-27-24 @ 16:09) (72 - 72)  BP: 97/63 (05-27-24 @ 16:09) (97/63 - 97/63)  RR: 18 (05-27-24 @ 16:09) (18 - 18)  SpO2: 100% (05-27-24 @ 16:09) (100% - 100%)    Physical Examination: INCOMPLETE  General - non-toxic appearing male/female in no acute distress  Neurologic Exam:  Mental status - Awake, Alert, Oriented to person, place, and time. Speech fluent, repetition and naming intact. Follows simple and complex commands.  Cranial nerves - PERRLA (4mm -> 3mm b/l), VFF, EOMI, face sensation (V1-V3) intact b/l, facial strength intact without asymmetry b/l, hearing intact b/l, palate with symmetric elevation,  sternocleidomastiod 5/5 strength b/l, tongue midline on protrusion with full lateral movement  Motor - Normal bulk and tone throughout. No pronator drift.  Strength testing            Deltoid      Biceps      Triceps     Wrist Extension    Wrist Flexion     Interossei         R            5                 5               5                     5                              5                        5                 5  L             5                 5               5                     5                              5                        5                 5              Hip Flexion    Hip Extension    Knee Flexion    Knee Extension    Dorsiflexion    Plantar Flexion  R              5                         5                       5                           5                            5                          5  L              5                         5                        5                           5                            5                          5  Sensation - Light touch/temperature OR pain/vibration intact throughout  DTR's -             Biceps      Triceps     Brachioradialis      Patellar    Ankle    Toes/plantar response  R             2+             2+                  2+                       2+            2+                 Down  L              2+             2+                 2+                        2+           2+                 Down    Coordination - Finger to Nose intact b/l. No tremors appreciated  Gait and station - Normal casual gait. Romberg (-)    Labs:          CAPILLARY BLOOD GLUCOSE      POCT Blood Glucose.: 129 mg/dL (27 May 2024 16:28)            Radiology:     Neurology - Consult Note    Spectra: [phone] ([facility]), [phone] ([facility])    HPI: 54 y/o ___handed man with Parkinson disease, GERD, HLD, [place] 3+4 prostate adenocarcinoma s/p radiation last dose (4/2024) presenting as code stroke for dizziness.    Patient's spouse providing most of information.     States for three days duration, patient has been having symptoms of chest discomfort, room spinning sensation. Two days prior to presentation, patient advocated shortness of breath, feeling like he was 'swallowing his teeth and tongue' lasting several hours. One day prior to presentation, patient had an episode of stiffness lasting several hours also associated with stiffness. On day of presentation, the patient advocated worsening shortness of breath and chest discomfort therefore presented to the ED for further workup.    Spouse reports patient has general dizziness since Parkinson's disease diagnosis, attributed to medications.     Not on any AC/AP, no reported recent changes in medications.  Blood pressure 97/63  Glucose 129    Baseline mentation:     ---  Follows with Dr. Jones in outpatient setting.  Meds: sinemet + rytary  PD: motor fluctuations, LID, medication wearing off every 2 hours   cannot take amantadine or gocovri due to history of glaucoma.  daily persistant headache - chronic     Patient was counseled on the following recommendations:   - Sinemet 2 tab every 3 hours along with Rytary 145 for each dose   - Stop entacapone  -Start Mirtazapine 15mg at bedtime  -Stop Ambien  - take MiraLAX every day to avoid constipation  - refer to [facility] specialist for consult  -Follow-up in 3 months  Renew: Rytary 36. MG Oral Capsule Extended Release; one tab 5 times a day ;  with each dose of Sinemet 6-9-12-3-6     Review of Systems:    NEUROLOGICAL: +As stated in HPI above  All other review of systems is negative unless indicated above.    Allergies:  No Known Allergies      PMHx/PSHx/Family Hx: As above, otherwise see below   No pertinent past medical history    Parkinsons    GERD (gastroesophageal reflux disease)    Hyperlipidemia    Glaucoma    Constipation        Social Hx:  as above    Vitals:  T(C): 36.7 (05-27-24 @ 16:09), Max: 36.7 (05-27-24 @ 16:09)  HR: 72 (05-27-24 @ 16:09) (72 - 72)  BP: 97/63 (05-27-24 @ 16:09) (97/63 - 97/63)  RR: 18 (05-27-24 @ 16:09) (18 - 18)  SpO2: 100% (05-27-24 @ 16:09) (100% - 100%)    Physical Examination: INCOMPLETE  General - non-toxic appearing male, appears mildly distressed  Neurologic Exam:  Mental status - Awake, Alert, Oriented to self, to month, not to year age. Speech is _____. Intermittently follows simple commands.  Cranial nerves - PERRLA (4mm -> 3mm b/l), VFF, EOMI, face sensation (V1-V3) intact b/l, facial strength intact without asymmetry b/l, hearing intact b/l, palate with symmetric elevation,  sternocleidomastiod 5/5 strength b/l, tongue midline on protrusion with full lateral movement  Motor - Normal bulk and tone throughout. No pronator drift.  Strength testing moves all extremities without drift in b/l UE, LE  Sensation - Light touch intact throughout  DTR's -             Biceps      Triceps     Brachioradialis      Patellar    Ankle    Toes/plantar response  R             2+             2+                  2+                       2+            2+                 Down  L              2+             2+                 2+                        2+           2+                 Down  Coordination - Finger to Nose intact b/l. No tremors appreciated  Gait and station - deferred    Labs:    CAPILLARY BLOOD GLUCOSE      POC Blood Glucose.: 129 mg/dL (27 May 2024 16:28)            Radiology:     Neurology - Consult Note    Spectra: [phone] ([facility]), [phone] ([facility])    HPI: 52 y/o man with Parkinson disease, GERD, HLD, [place] 3+4 prostate adenocarcinoma s/p radiation last dose (4/2024) presenting as code stroke for shortness of breath, nasal congestion, dizziness. Patient's spouse providing most of information.     States for three days duration, patient has been having symptoms of chest discomfort, room spinning sensation. Two days prior to presentation, patient advocated shortness of breath, feeling like he was 'swallowing his teeth and tongue' lasting several hours. One day prior to presentation, patient had an episode of stiffness lasting several hours in duration. On day of presentation, the patient advocated worsening shortness of breath and chest discomfort therefore presented to the ED for further workup. Symptoms often present in the afternoon.  Spouse reports patient has general intermittent dizziness since Parkinson's disease diagnosis, attributed to medications, since 2019, positional in nature lasting several minutes in duration. Not on any AC/AP, no reported recent changes in medications.     Blood pressure 97/63  Glucose 129  Labs: 12.7, glucose 106, trop 8, lactate 2.1,     Follows with Dr. Jones in outpatient setting.  Meds: sinemet  tabs (2 tabs) at 6-9-12-3-6 and rytary 36. mg caps QID  PD: motor fluctuations, levodopa induced dyskinesia, medication wearing off every 2 hours  daily persistent headache - chronic      Review of Systems:    NEUROLOGICAL: +As stated in HPI above  All other review of systems is negative unless indicated above.    Allergies:  No Known Allergies      PMHx/PSHx/Family Hx: As above, otherwise see below   No pertinent past medical history  Parkinsons    GERD (gastroesophageal reflux disease)    Hyperlipidemia    Glaucoma    Constipation        Social Hx:  as above    Vitals:  T(C): 36.7 (05-27-24 @ 16:09), Max: 36.7 (05-27-24 @ 16:09)  HR: 72 (05-27-24 @ 16:09) (72 - 72)  BP: 97/63 (05-27-24 @ 16:09) (97/63 - 97/63)  RR: 18 (05-27-24 @ 16:09) (18 - 18)  SpO2: 100% (05-27-24 @ 16:09) (100% - 100%)    Physical Examination:   General - non-toxic appearing male, appears mildly distressed -> upon re-evaluation at bedside appears calmer  Neurologic Exam:  Mental status - Awake, Alert, Oriented to self, to month, to location, to year and day of week. Speech is slightly hypophonic. Follows simple commands.  Cranial nerves - PERRLA (4mm -> 3mm b/l), VFF, EOMI, face sensation (V1-V3) intact b/l, facial strength intact without asymmetry b/l, hearing intact b/l, palate with symmetric elevation,  sternocleidomastiod 5/5 strength b/l, tongue midline on protrusion with full lateral movement. No nystagmusl.  Motor - Normal bulk and slight cogwheel rigidity R>L. No pronator drift.  Strength testing moves all extremities without drift in b/l UE, LE  Sensation - Light touch intact throughout  DTR's - deferred  Coordination - Finger to Nose intact b/l. No tremors appreciated. Heel shin intact b/l.  Gait and station - deferred    Labs:    CAPILLARY BLOOD GLUCOSE    POC Blood Glucose.: 129 mg/dL (27 May 2024 16:28)    Radiology:  IMPRESSION:  No acute intracranial hemorrhage, mass effect, or midline shift.      CT angiography neck:  1.  No hemodynamically significant stenosis of the bilateral cervical   ICAs using NASCET criteria.  Patent vertebral arteries.  No evidence of   vascular dissection.  2.  A 7 mm right thyroid nodule.    CT angiography brain: No large vessel occlusion. No evidence of aneurysm. Neurology - Consult Note    Spectra: [phone] ([facility]), [phone] ([facility])    HPI: 54 y/o man with Parkinson disease, GERD, HLD, [place] 3+4 prostate adenocarcinoma s/p radiation last dose (4/2024) presenting as code stroke for shortness of breath, nasal congestion, dizziness. Patient's spouse providing most of information.     States for three days duration, patient has been having symptoms of chest discomfort, room spinning sensation. Two days prior to presentation, patient advocated shortness of breath, feeling like he was 'swallowing his teeth and tongue' lasting several hours. One day prior to presentation, patient had an episode of stiffness lasting several hours in duration. On day of presentation, the patient advocated worsening shortness of breath and chest discomfort therefore presented to the ED for further workup. Symptoms often present in the afternoon.  Spouse reports patient has general intermittent dizziness since Parkinson's disease diagnosis, attributed to medications, since 2019, positional in nature lasting several minutes in duration and current symptoms of dizziness are unchanged. Not on any AC/AP, no reported recent changes in medications.     Blood pressure 97/63  Glucose 129  Labs: 12.7, glucose 106, trop 8, lactate 2.1,     Follows with Dr. Jones in outpatient setting.  Meds: sinemet  tabs (2 tabs) at 6-9-12-3-6 and rytary 36. mg caps QID  PD: motor fluctuations, levodopa induced dyskinesia, medication wearing off every 2 hours  daily persistent headache - chronic      Review of Systems:    NEUROLOGICAL: +As stated in HPI above  All other review of systems is negative unless indicated above.    Allergies:  No Known Allergies      PMHx/PSHx/Family Hx: As above, otherwise see below   No pertinent past medical history  Parkinsons    GERD (gastroesophageal reflux disease)    Hyperlipidemia    Glaucoma    Constipation        Social Hx:  as above    Vitals:  T(C): 36.7 (05-27-24 @ 16:09), Max: 36.7 (05-27-24 @ 16:09)  HR: 72 (05-27-24 @ 16:09) (72 - 72)  BP: 97/63 (05-27-24 @ 16:09) (97/63 - 97/63)  RR: 18 (05-27-24 @ 16:09) (18 - 18)  SpO2: 100% (05-27-24 @ 16:09) (100% - 100%)    Physical Examination:   General - non-toxic appearing male, appears mildly distressed -> upon re-evaluation at bedside appears calmer  Neurologic Exam:  Mental status - Awake, Alert, Oriented to self, to month, to location, to year and day of week. Speech is slightly hypophonic. Follows simple commands.  Cranial nerves - PERRLA (4mm -> 3mm b/l), VFF, EOMI, face sensation (V1-V3) intact b/l, facial strength intact without asymmetry b/l, hearing intact b/l, palate with symmetric elevation,  sternocleidomastiod 5/5 strength b/l, tongue midline on protrusion with full lateral movement. No nystagmusl.  Motor - Normal bulk and slight cogwheel rigidity R>L. No pronator drift.  Strength testing moves all extremities without drift in b/l UE, LE  Sensation - Light touch intact throughout  DTR's - deferred  Coordination - Finger to Nose intact b/l. No tremors appreciated. Heel shin intact b/l.  Gait and station - deferred    Labs:    CAPILLARY BLOOD GLUCOSE    POC Blood Glucose.: 129 mg/dL (27 May 2024 16:28)    Radiology:  IMPRESSION:  No acute intracranial hemorrhage, mass effect, or midline shift.      CT angiography neck:  1.  No hemodynamically significant stenosis of the bilateral cervical   ICAs using NASCET criteria.  Patent vertebral arteries.  No evidence of   vascular dissection.  2.  A 7 mm right thyroid nodule.    CT angiography brain: No large vessel occlusion. No evidence of aneurysm.

## 2024-05-27 NOTE — CONSULT NOTE ADULT - ASSESSMENT
ASSESSMENT   54 y/o man with Parkinson disease, GERD, HLD, [place] 3+4 prostate adenocarcinoma s/p radiation last dose (4/2024) presenting as code stroke for shortness of breath, nasal congestion, dizziness. Patient's spouse providing most of information.   States for three days duration, patient has been having symptoms of chest discomfort, room spinning sensation. Two days prior to presentation, patient advocated shortness of breath, feeling like he was 'swallowing his teeth and tongue' lasting several hours. One day prior to presentation, patient had an episode of stiffness lasting several hours in duration. On day of presentation, the patient advocated worsening shortness of breath and chest discomfort therefore presented to the ED for further workup. Symptoms often present in the afternoon.  Spouse reports patient has general intermittent dizziness since Parkinson's disease diagnosis, attributed to medications, since 2019, positional in nature lasting several minutes in duration and current symptoms of dizziness are intermittent and unchanged. Not on any AC/AP, no reported recent changes in medications.   Blood pressure 97/63  Glucose 129  Labs: 12.7, glucose 106, trop 8, lactate 2.1,     IMPRESSION   Acute on chronic vestibular syndrome in patient with history of Parkinson's disease, may be i/s/o medications or autonomic dysfunction.    RECOMMENDATION   [] continue home PD regimen and recommend patient follow up closely with Dr Jones in outpatient setting  [] chest discomfort/nasal congestion workup per primary team  [] orthostatic blood pressures  [] no further inpatient neurological workup recommended at this time    Discussed with neurology attending on call. Note finalized upon attending attestation.  ASSESSMENT   52 y/o man with Parkinson disease, GERD, HLD, [place] 3+4 prostate adenocarcinoma s/p radiation last dose (4/2024) presenting as code stroke for shortness of breath, nasal congestion, dizziness. Patient's spouse providing most of information.   States for three days duration, patient has been having symptoms of chest discomfort, room spinning sensation. Two days prior to presentation, patient advocated shortness of breath, feeling like he was 'swallowing his teeth and tongue' lasting several hours. One day prior to presentation, patient had an episode of stiffness lasting several hours in duration. On day of presentation, the patient advocated worsening shortness of breath and chest discomfort therefore presented to the ED for further workup. Symptoms often present in the afternoon.  Spouse reports patient has general intermittent dizziness since Parkinson's disease diagnosis, attributed to medications, since 2019, positional in nature lasting several minutes in duration and current symptoms of dizziness are intermittent and unchanged. Not on any AC/AP, no reported recent changes in medications.   Blood pressure 97/63  Glucose 129  Labs: 12.7, glucose 106, trop 8, lactate 2.1,     IMPRESSION   Acute on chronic vestibular syndrome in patient with history of Parkinson's disease, may be i/s/o medications or autonomic dysfunction.    RECOMMENDATION   [] continue home PD regimen and recommend patient follow up closely with Dr Jones in outpatient setting  [] chest discomfort/nasal congestion workup per primary team  [] cardiology workup per primary team  [] orthostatic blood pressures  [] no further inpatient neurological workup recommended at this time    Discussed with neurology attending on call. Note finalized upon attending attestation.

## 2024-05-27 NOTE — ED CDU PROVIDER INITIAL DAY NOTE - CLINICAL SUMMARY MEDICAL DECISION MAKING FREE TEXT BOX
52 yo M w/ hx of HTN, HLD, GERD, parkinson's, presenting w/ c/o of shortness of breath and dizziness x 3 days. In the ED, code stroke was initiated on arrival. Neuro evaluated the patient and CTs were performed. CTs were unremarkable for acute findings. Neuro cleared patient that they were not concerned that his symptoms were related to stroke. Labs results were wnl. Plan is for patient to be transferred to CDU for continuous cardiac monitoring, stress test, and tele doc eval in the am.

## 2024-05-27 NOTE — ED PROVIDER NOTE - PROGRESS NOTE DETAILS
VSS not tachycardic pulse ox 100RA no WOB. CBC unremarkable CMP within normal troponin 10 will repeat.  Head CT stroke unremarkable. EKG HR 73sr no rikki qt/qtc 384/423 no cp or sob now. pending rpt trop, ct chest,  and tba CT chest no PE.  Patient feeling better.  No longer having chest pain or shortness of breath.  Appears more comfortable.  Spoke to patient wife does not have a cardiologist.  Never had a cardiac workup.  Pending final recommendations from neurology.  Potentially CDU for echocardiogram and stress test and cardiology evaluation. Giles, PGY-3, EM: CDU accepts pt for stress test.

## 2024-05-27 NOTE — CONSULT NOTE ADULT - ATTENDING COMMENTS
He has no new neurological symptoms - all reported symptoms are chronic and intermittent.     Exam:    Mental status   Awake, alert, appropriate.     Motor exam    Bradykinesia  Hypomimia   Hypophonia   Only mild slowing of RSM and DEB.   Tone:  Cogwheel rigidity:   No Tremor     A/P  Mr. Canales is a 52 yo man with idiopathic Parkinson disease with no new neurological symptoms.  We do not recommend any neurological work up at this time.    Make sure he continues on his outpatient Parkinson disease medication regimen.    Neurology signing off. Please reconsult PRN or call [facility] with any questions.  D/W patient and CDU providers.   Thank you

## 2024-05-27 NOTE — ED CDU PROVIDER INITIAL DAY NOTE - ATTENDING APP SHARED VISIT CONTRIBUTION OF CARE
Attending Statement: I have reviewed and agree with all pertinent clinical information, including history and physical exam and agree with treatment plan of the PA, except as noted.  53-year-old male history of Parkinson disease, hypertension, high cholesterol, GERD, prostate CA status post radiation therapy from home with wife at bedside chief complaints chest pain and shortness of breath for the last 3 days.  Wife states that he has been complaining of intermittent midsternal chest tightness, nonexertional.  Associated with shortness of breath.  States that today though things "are worse".  Patient was complaining of "spinning dizzy sensation for the last 3 hours" and "not talking for the last hour" prompted ER visit.  With last known normal approximately 4 hours ago.  On arrival patient very uncomfortable.  Appears short of breath.  Minimally answering questions but following commands.  No sign of trauma.  No obvious facial asymmetry.  Moving all extremities.  Normal sensation throughout.  Appears uncomfortable moving around the bed.  But not hypoxic.  No grimace to palpation of abdomen.  History primarily obtained via wife.  Given acute onset of symptoms code stroke was called.  Neurology at bedside.  Taken to CAT scan for emergent CT head CT angio head and neck and CT chest rule out PE.  Labs ordered EKG done.  While neurologist talking to the patient wife now she states that he has had intermittent dizziness for 3 days now 3 hours.  He has had similar symptoms in the past.  However this is the worst has been.  He is primarily complaining of chest pain and shortness of breath prior to arrival prompting ER visit.  No focal deficits upon neurology's exam.  CT chest no PE.  Patient feeling better.  No longer having chest pain or shortness of breath.  Appears more comfortable.  Spoke to patient wife does not have a cardiologist.  Never had a cardiac workup.  plan cardiac monitor, cardiology eval and stress for cp  and echo  for sob  test

## 2024-05-28 ENCOUNTER — RESULT REVIEW (OUTPATIENT)
Age: 54
End: 2024-05-28

## 2024-05-28 VITALS
HEART RATE: 82 BPM | SYSTOLIC BLOOD PRESSURE: 121 MMHG | OXYGEN SATURATION: 100 % | TEMPERATURE: 98 F | RESPIRATION RATE: 17 BRPM | DIASTOLIC BLOOD PRESSURE: 77 MMHG

## 2024-05-28 LAB — ADD ON TEST-SPECIMEN IN LAB: SIGNIFICANT CHANGE UP

## 2024-05-28 PROCEDURE — 78451 HT MUSCLE IMAGE SPECT SING: CPT | Mod: 26,MC

## 2024-05-28 PROCEDURE — 99239 HOSP IP/OBS DSCHRG MGMT >30: CPT

## 2024-05-28 PROCEDURE — 93018 CV STRESS TEST I&R ONLY: CPT | Mod: GC,MC

## 2024-05-28 PROCEDURE — 99284 EMERGENCY DEPT VISIT MOD MDM: CPT

## 2024-05-28 PROCEDURE — 99283 EMERGENCY DEPT VISIT LOW MDM: CPT

## 2024-05-28 PROCEDURE — 93016 CV STRESS TEST SUPVJ ONLY: CPT | Mod: GC,MC

## 2024-05-28 RX ADMIN — FAMOTIDINE 20 MILLIGRAM(S): 10 INJECTION INTRAVENOUS at 05:29

## 2024-05-28 RX ADMIN — CARBIDOPA AND LEVODOPA 2 TABLET(S): 25; 100 TABLET ORAL at 11:55

## 2024-05-28 RX ADMIN — TAMSULOSIN HYDROCHLORIDE 0.4 MILLIGRAM(S): 0.4 CAPSULE ORAL at 05:29

## 2024-05-28 RX ADMIN — CARBIDOPA AND LEVODOPA 2 TABLET(S): 25; 100 TABLET ORAL at 05:29

## 2024-05-28 RX ADMIN — ATORVASTATIN CALCIUM 10 MILLIGRAM(S): 80 TABLET, FILM COATED ORAL at 05:29

## 2024-05-28 RX ADMIN — MIRTAZAPINE 15 MILLIGRAM(S): 45 TABLET, ORALLY DISINTEGRATING ORAL at 11:55

## 2024-05-28 NOTE — ED CDU PROVIDER DISPOSITION NOTE - NSFOLLOWUPINSTRUCTIONS_ED_ALL_ED_FT
Chiquita Panchal Lumbus was seen and treated in our emergency department on 3/8/2024.  She may return to work on 03/11/2024.       If you have any questions or concerns, please don't hesitate to call.      Daxa Gonzalez PA-C FOLLOW UP WITH YOUR CARDIOLOGIST or Dr. Griffith with information provided to you make and appointment AND/ OR YOUR PRIMARY MEDICAL DOCTOR IN 2-3 DAYS OR WITHIN THE WEEK.     SHOW COPIES OF YOUR RESULTS/REPORTS GIVEN TO YOU.      Take all of your other medications as previously prescribed.     Worsening or continued chest pain, shortness of breath, weakness, return to ED.

## 2024-05-28 NOTE — ED CDU PROVIDER SUBSEQUENT DAY NOTE - CLINICAL SUMMARY MEDICAL DECISION MAKING FREE TEXT BOX
52 yo M w/ hx of HTN, HLD, GERD, parkinson's, presenting w/ c/o of shortness of breath and dizziness x 3 days. In the ED, code stroke was initiated on arrival. Neuro evaluated the patient and CTs were performed. CTs were unremarkable for acute findings. Neuro cleared patient that they were not concerned that his symptoms were related to stroke. Labs results were wnl. Plan is for patient to be transferred to CDU for continuous cardiac monitoring, stress test, and tele doc eval in the am. Patient still pending continuous cardiac monitoring, stress test, and tele doc eval in the am.

## 2024-05-28 NOTE — ED CDU PROVIDER DISPOSITION NOTE - CARE PROVIDER_API CALL
Jag Griffith  Cardiology  [id number] [place] [address], [address]  [place] [address]  Phone: [phone]  Fax: [phone]  Follow Up Time:

## 2024-05-28 NOTE — ED CDU PROVIDER DISPOSITION NOTE - ATTENDING CONTRIBUTION TO CARE
52 yo M w/ hx of HTN, HLD, GERD, parkinson's, presenting w/ c/o of shortness of breath and dizziness. He has been complaining fo this for 3 days. Additional c/o congestion and pain in his chest.   In the ED, code stroke was initiated on arrival. Neuro evaluated the patient and CTs were performed. CTs were unremarkable for acute findings. Neuro cleared patient that they were not concerned that his symptoms were related to stroke. Labs results were wnl. Tele unremarkable, Stress impression normal perfusion scan and EF 65%.  patient feeling much improved. Patient seen by Dr. Griffith with cardiology recommendations dc outpatient follow up provided to patient

## 2024-05-28 NOTE — CONSULT NOTE ADULT - SUBJECTIVE AND OBJECTIVE BOX
Cardiology/Vascular Medicine Inpatient Consultation Note      HISTORY OF PRESENT ILLNESS:  HPI:          Allergies    No Known Allergies    Intolerances    	    MEDICATIONS:        acetaminophen     Tablet .. 650 milliGRAM(s) Oral every 6 hours PRN  carbidopa/levodopa  25/100 2 Tablet(s) Oral four times a day  mirtazapine 15 milliGRAM(s) Oral daily    famotidine    Tablet 20 milliGRAM(s) Oral two times a day    atorvastatin 10 milliGRAM(s) Oral daily    tamsulosin 0.4 milliGRAM(s) Oral daily      PAST MEDICAL & SURGICAL HISTORY:  Parkinsons      GERD (gastroesophageal reflux disease)      Hyperlipidemia      Glaucoma      Constipation      No significant past surgical history          FAMILY HISTORY:  Family history of diabetes mellitus type II (Father)        SOCIAL HISTORY:    [ ] Non-smoker  [ ] Smoker  [ ] Alcohol    REVIEW OF SYSTEMS:  CONSTITUTIONAL: No fever, weight loss, or fatigue  EYES: No eye pain, visual disturbances, or discharge  ENMT:  No difficulty hearing, tinnitus, vertigo; No sinus or throat pain  NECK: No pain or stiffness  RESPIRATORY: No cough, wheezing, chills or hemoptysis; No Shortness of Breath  CARDIOVASCULAR: No chest pain, palpitations, passing out, dizziness, or leg swelling  GASTROINTESTINAL: No abdominal or epigastric pain. No nausea, vomiting, or hematemesis; No diarrhea or constipation. No melena or hematochezia.  GENITOURINARY: No dysuria, frequency, hematuria, or incontinence  NEUROLOGICAL: No headaches, memory loss, loss of strength, numbness, or tremors  SKIN: No itching, burning, rashes, or lesions   LYMPH Nodes: No enlarged glands  ENDOCRINE: No heat or cold intolerance; No hair loss  MUSCULOSKELETAL: No joint pain or swelling; No muscle, back, or extremity pain  PSYCHIATRIC: No depression, anxiety, mood swings, or difficulty sleeping  HEME/LYMPH: No easy bruising, or bleeding gums  ALLERY AND IMMUNOLOGIC: No hives or eczema	    [ ] All others negative	  [ ] Unable to obtain    PHYSICAL EXAM:  T(C): 36.4 (05-28-24 @ 05:26), Max: 36.8 (05-27-24 @ 22:07)  HR: 64 (05-28-24 @ 05:26) (64 - 78)  BP: 130/81 (05-28-24 @ 05:26) (97/63 - 138/81)  RR: 18 (05-28-24 @ 05:26) (15 - 18)  SpO2: 99% (05-28-24 @ 05:26) (99% - 100%)  Wt(kg): --  I&O's Summary      Appearance: Normal	  HEENT:   Normal oral mucosa, PERRL, EOMI	  Lymphatic: No lymphadenopathy  Cardiovascular: Normal S1 S2, No JVD, No murmurs, No edema  Respiratory: Lungs clear to auscultation	  Psychiatry: A & O x 3, Mood & affect appropriate  Gastrointestinal:  Soft, Non-tender, + BS	  Skin: No rashes, No ecchymoses, No cyanosis	  Neurologic: Non-focal  Extremities: Normal range of motion, No clubbing, cyanosis or edema  Vascular: Peripheral pulses palpable 2+ bilaterally      LABS:	 	    CBC Full  -  ( 27 May 2024 16:44 )  WBC Count : 5.32 K/uL  Hemoglobin : 12.7 g/dL  Hematocrit : 36.3 %  Platelet Count - Automated : 201 K/uL  Mean Cell Volume : 88.3 fL  Mean Cell Hemoglobin : 30.9 pg  Mean Cell Hemoglobin Concentration : 35.0 gm/dL  Auto Neutrophil # : 3.11 K/uL  Auto Lymphocyte # : 1.56 K/uL  Auto Monocyte # : 0.44 K/uL  Auto Eosinophil # : 0.18 K/uL  Auto Basophil # : 0.02 K/uL  Auto Neutrophil % : 58.4 %  Auto Lymphocyte % : 29.3 %  Auto Monocyte % : 8.3 %  Auto Eosinophil % : 3.4 %  Auto Basophil % : 0.4 %    05-27    138  |  102  |  11  ----------------------------<  106<H>  4.0   |  23  |  0.69    Ca    9.3      27 May 2024 16:44    TPro  7.4  /  Alb  4.3  /  TBili  0.3  /  DBili  x   /  AST  24  /  ALT  5   /  AlkPhos  71  05-27      proBNP:   Lipid Profile:   HgA1c:   TSH:       CARDIAC MARKERS:            TELEMETRY: 	    ECG:   	  RADIOLOGY:  OTHER: 	      PREVIOUS DIAGNOSTIC CARDIOVASCULAR TESTING:      [ ]  Echocardiogram:  [ ]  Catheterization:  [ ]  Stress test:    [ ]  Vascular studies:  	  	     Cardiology/Vascular Medicine Inpatient Consultation Note    HISTORY OF PRESENT ILLNESS:    Patient is a 54 yo man with HTN, HLD, GERD, and Parkinson's.  He presents with complaint of shortness of breath and dizziness over the three days prior to presentation.  He is also reporting that he had symptoms of chest pain.     in his chest. Pt has been taking all his medications. While in ECG pt was looking all over, intermittently not answering tech so was brought to TRB. Pt's wife states for three days duration, patient has been having symptoms of chest discomfort, room spinning sensation. Two days prior to presentation, patient advocated shortness of breath, feeling like he was 'swallowing his teeth and tongue' lasting several hours. One day prior to presentation, patient had an episode of stiffness lasting several hours in duration. The patient advocated worsening shortness of breath and chest discomfort therefore presented to the ED for further workup. In the ED, code stroke was initiated on arrival. Neuro evaluated the patient and CTs were performed. CTs were unremarkable for acute findings. Neuro cleared patient that they were not concerned that his symptoms were related to stroke. Labs results were wnl. Plan is for patient to be transferred to CDU for continuous cardiac monitoring, stress test, and tele doc eval in the am.    Interval history: Patient continues to rest comfortably in CDU. Vitals have been stable throughout CDU stay. No significant events noted on continuous telemonitoring. Patient has no new complaints or concerns upon re-evaluation. Patient still pending continuous cardiac monitoring, stress test, and tele doc eval in the am.        Allergies    No Known Allergies    Intolerances    	    MEDICATIONS:  acetaminophen     Tablet .. 650 milliGRAM(s) Oral every 6 hours PRN  carbidopa/levodopa  25/100 2 Tablet(s) Oral four times a day  mirtazapine 15 milliGRAM(s) Oral daily  famotidine    Tablet 20 milliGRAM(s) Oral two times a day  atorvastatin 10 milliGRAM(s) Oral daily  tamsulosin 0.4 milliGRAM(s) Oral daily    PAST MEDICAL & SURGICAL HISTORY:  Parkinsons  GERD (gastroesophageal reflux disease)  Hyperlipidemia  Glaucoma  Constipation    No significant past surgical history    FAMILY HISTORY:  Family history of diabetes mellitus type II (Father)    SOCIAL HISTORY:    As above, as per chart notes    REVIEW OF SYSTEMS:  As above, as per chart notes    PHYSICAL EXAM:  T(C): 36.4 (05-28-24 @ 05:26), Max: 36.8 (05-27-24 @ 22:07)  HR: 64 (05-28-24 @ 05:26) (64 - 78)  BP: 130/81 (05-28-24 @ 05:26) (97/63 - 138/81)  RR: 18 (05-28-24 @ 05:26) (15 - 18)  SpO2: 99% (05-28-24 @ 05:26) (99% - 100%)    Appearance: NAD  HEENT:   No apparent JVD  Cardiovascular: Normal S1 S2  Respiratory: Decreased breath sounds bilaterally  Psychiatry: Awake, alert  Gastrointestinal:  Soft, Non-tender, + BS	  Neurologic: Non-focal  Extremities: No LE edema      LABS:	 	    CBC Full  -  ( 27 May 2024 16:44 )  WBC Count : 5.32 K/uL  Hemoglobin : 12.7 g/dL  Hematocrit : 36.3 %  Platelet Count - Automated : 201 K/uL  Mean Cell Volume : 88.3 fL  Mean Cell Hemoglobin : 30.9 pg  Mean Cell Hemoglobin Concentration : 35.0 gm/dL  Auto Neutrophil # : 3.11 K/uL  Auto Lymphocyte # : 1.56 K/uL  Auto Monocyte # : 0.44 K/uL  Auto Eosinophil # : 0.18 K/uL  Auto Basophil # : 0.02 K/uL  Auto Neutrophil % : 58.4 %  Auto Lymphocyte % : 29.3 %  Auto Monocyte % : 8.3 %  Auto Eosinophil % : 3.4 %  Auto Basophil % : 0.4 %    05-27    138  |  102  |  11  ----------------------------<  106<H>  4.0   |  23  |  0.69    Ca    9.3      27 May 2024 16:44    TPro  7.4  /  Alb  4.3  /  TBili  0.3  /  DBili  x   /  AST  24  /  ALT  5   /  AlkPhos  71  05-27       Cardiology/Vascular Medicine Inpatient Consultation Note    HISTORY OF PRESENT ILLNESS:    Patient is a 52 yo man with HTN, HLD, GERD, and Parkinson's.  He presents with complaint of shortness of breath and dizziness over the three days prior to presentation.  He is also reporting that he had symptoms of chest pain, not necessarily related with exertion.  No other associated cardiac complaints or exertional limitations.  He otherwise denies having had a recent illness.    My review of her 12-lead ECG and bedside telemetry --> SR, no events  Cardiac biomarkers flat.  CXR unremarkable for acute findings.  CTA chest negative for obvious PE, ?cardiomegaly, possible vascular congestion.  CTA neck no acute findings.    At the time of my evaluation, the patient appeared clinically and hemodynamically stable.  Unremarkable physical exam.  He appeared euvolemic on my exam.    He has already been scheduled to undergo an ischemic evaluation with a nuclear stress test, which was unremarkable for ischemia.    Patient was reassured.  From the cardiac perspective, the patient may be discharged to home without the need for further inpatient cardiac testing.  He has been advised to f/u with his PMD within 1-2 weeks for further evaluation as needed.    Allergies  No Known Allergies    MEDICATIONS:  acetaminophen     Tablet .. 650 milliGRAM(s) Oral every 6 hours PRN  carbidopa/levodopa  25/100 2 Tablet(s) Oral four times a day  mirtazapine 15 milliGRAM(s) Oral daily  famotidine    Tablet 20 milliGRAM(s) Oral two times a day  atorvastatin 10 milliGRAM(s) Oral daily  tamsulosin 0.4 milliGRAM(s) Oral daily    PAST MEDICAL & SURGICAL HISTORY:  Parkinsons  GERD (gastroesophageal reflux disease)  Hyperlipidemia  Glaucoma  Constipation    No significant past surgical history    FAMILY HISTORY:  Family history of diabetes mellitus type II (Father)    SOCIAL HISTORY:    As above, as per chart notes    REVIEW OF SYSTEMS:  As above, as per chart notes    PHYSICAL EXAM:  T(C): 36.4 (05-28-24 @ 05:26), Max: 36.8 (05-27-24 @ 22:07)  HR: 64 (05-28-24 @ 05:26) (64 - 78)  BP: 130/81 (05-28-24 @ 05:26) (97/63 - 138/81)  RR: 18 (05-28-24 @ 05:26) (15 - 18)  SpO2: 99% (05-28-24 @ 05:26) (99% - 100%)    Appearance: NAD  HEENT:   No apparent JVD  Cardiovascular: Normal S1 S2  Respiratory: Decreased breath sounds bilaterally  Psychiatry: Awake, alert  Gastrointestinal:  Soft, Non-tender, + BS	  Neurologic: Non-focal  Extremities: No LE edema      LABS:	 	    CBC Full  -  ( 27 May 2024 16:44 )  WBC Count : 5.32 K/uL  Hemoglobin : 12.7 g/dL  Hematocrit : 36.3 %  Platelet Count - Automated : 201 K/uL  Mean Cell Volume : 88.3 fL  Mean Cell Hemoglobin : 30.9 pg  Mean Cell Hemoglobin Concentration : 35.0 gm/dL  Auto Neutrophil # : 3.11 K/uL  Auto Lymphocyte # : 1.56 K/uL  Auto Monocyte # : 0.44 K/uL  Auto Eosinophil # : 0.18 K/uL  Auto Basophil # : 0.02 K/uL  Auto Neutrophil % : 58.4 %  Auto Lymphocyte % : 29.3 %  Auto Monocyte % : 8.3 %  Auto Eosinophil % : 3.4 %  Auto Basophil % : 0.4 %    05-27    138  |  102  |  11  ----------------------------<  106<H>  4.0   |  23  |  0.69    Ca    9.3      27 May 2024 16:44    TPro  7.4  /  Alb  4.3  /  TBili  0.3  /  DBili  x   /  AST  24  /  ALT  5   /  AlkPhos  71  05-27      < from: Xray Chest 1 View- PORTABLE-Urgent (05.27.24 @ 17:32) >    ACC: [id number] EXAM:  XR CHEST PORTABLE URGENT 1V   ORDERED BY: JEOVANNY FLORES     PROCEDURE DATE:  05/27/2024          INTERPRETATION:  EXAMINATION: XR CHEST URGENT    CLINICAL INDICATION: Shortness of breath and code stroke.    TECHNIQUE: Single frontal portable view of the chest from 5/27/2024 5:32   PM    COMPARISON: Same day CT chest.    FINDINGS:    The heart size is borderline.  The lungs are clear.  There is no pneumothorax or pleural effusion.    IMPRESSION:  Clear lungs.    --- End ofReport ---          SRI CALL MD; Resident Radiologist  This document has been electronically signed.  FERNIE FITZPATRICK MD; Attending Interventional Radiologist  This document has been electronically signed. May 28 2024  9:35AM    < end of copied text >  < from: CT Angio Chest PE Protocol w/ IV Cont (05.27.24 @ 17:03) >    ACC: [id number] EXAM:  CT ANGIO CHEST PULM ART WAWIC   ORDERED BY: JEOVANNY FLORES     PROCEDURE DATE:  05/27/2024          INTERPRETATION:  CLINICAL INFORMATION: Shortness of breath    COMPARISON: None.    CONTRAST/COMPLICATIONS:  IV Contrast: Omnipaque 350  52 cc administered  Oral Contrast: NONE  Complications: None reported at time of study completion    PROCEDURE:  CT of the Chest was performed.  Sagittal and coronal reformats were performed.    FINDINGS: Study limited by artifact from respiratory motion and upper   extremities on the sides of the patient.    LUNGS AND AIRWAYS: Patent central airways.  Hypoinflated lungs. Possible   mild congestion. No pulmonary consolidation or suspicious nodule.  PLEURA: No pleural effusion or pneumothorax.  MEDIASTINUM AND RHINA: No lymphadenopathy.  VESSELS: Limited evaluation due to artifact as above. No pulmonary emboli   identified. No thoracic aortic aneurysm.  HEART: Mild cardiomegaly.. No pericardial effusion.  CHEST WALL AND LOWER NECK: Within normal limits.  VISUALIZED UPPER ABDOMEN: Within normal limits.  BONES: Degenerative changes    IMPRESSION:  Limited study due to artifact. No pulmonary emboli identified.    Mild cardiomegaly. Possible mild pulmonary congestion.    --- End of Report ---    < end of copied text >  < from: CT Angio Neck Stroke Protocol w/ IV Cont (05.27.24 @ 17:04) >  ACC: [id number] EXAM:  CT ANGIO NECK STROKE PROTCL IC   ORDERED BY: JEOVANNY FLORES     ACC: [id number] EXAM:  CT ANGIO BRAIN STROKE PROTC IC   ORDERED BY: JEOVANNY FLORES     PROCEDURE DATE:  05/27/2024          INTERPRETATION:  CLINICAL HISTORY: Vertigo    TECHNIQUE: Contrast enhanced axial CT images were acquired from the   aortic arch to the vertex of the calvarium, during the angiographic   phase.  Three-dimensional maximum intensity projection reformats were   generated.    CONTRAST: Omnipaque 350: 80 cc administered, .    COMPARISON STUDY: MRI brain 8/25/2023.  CTA head and neck 8/24/2023.    FINDINGS:    CT ANGIOGRAPHY NECK:    Thoracic aorta and branch vessels: Patent.  Right carotid system: Patent.  No hemodynamically significant stenosis   using NASCET criteria.  No evidence of dissection.  Left carotid system: Patent.  No hemodynamically significant stenosis   using NASCET criteria.  No evidence of dissection.  Vertebral arteries: No focal stenosis or dissection.    A 7 mm rightthyroid nodule.    CT ANGIOGRAPHY BRAIN:    Internal carotid arteries: Patent.  Anterior cerebral arteries: Patent.  Middle cerebral arteries: Patent.  Posterior cerebral arteries: Patent.  Vertebrobasilar: Patent.    Vascular lesions: No evidence ofintracranial aneurysm or large vascular   malformation, within limits of CT technique.      IMPRESSION:    CT angiography neck:  1.  No hemodynamically significant stenosis of the bilateral cervical   ICAs using NASCET criteria.  Patent vertebral arteries.  No evidence of   vascular dissection.  2.  A 7 mm right thyroid nodule.    CT angiography brain: No large vessel occlusion. No evidence of aneurysm.    --- End of Report ---            SRI BERGMAN MD; Attending Radiologist  This document has been electronically signed. May 27 2024  5:21PM    < end of copied text >        < from: Nuclear Stress Test-Pharmacologic.. (05.28.24 @ 07:52) >    Nuclear Pharmacologic Stress Test Report         Patient: GARRETT BENOIT           Study Date: 5/28/2024           MRN: [id number]                  Birth Date/Age: 1970 Age: 53                                                           years      Access #: [id number]                          Gender: M     Order Loc: [facility]                              Height: 162.6 cm/ 64.0 in  Request Phys: [id number] Not Available Doctor         Weight: 64.86 kg/ 143.00 lb     Procedure: Stress Pharmacologic             BSA/ BMI: 1.70 m²/ 24.55 kg/m²    Indication: Shortness of breath -       Admiss Status: ER                R06.02    Fellow/ACP: Ally Coleman                   Fellow/ACP: Autumn López MD    ---------------------------------------------------------------------------------------------------------------------------------------------------------  Procedure Code: TETROFOSMIN PER DOSE 2nd - .m;INJECTION REGADENOSON -                  .m;MYOCARDIAL SPECT SINGLE - 62221.m;STRESS TEST TRACING                  ONLY - 54866.m    --------------------------------------------------------------------------------------------------------------------------------------------------------- Fellow/SNOW: Narciso Braswell MD    ---------------------------------------------------------------------------------------------------------------------------------------------------------  History:       53 year old male with histroy of hypertension , hyperlipidemia                 presents with dizziness and shortness of breath.  Risk Factors:  Hypertension, dyslipidemia and family history of CAD.  Image Quality: Excellent  Artifact:      Increased GI uptake of tracer.  Medications:   Tylenol, lipitor, sinemet, pepcid, remeron, flomax.  Allergies:   None    --------------------------------------------------------------------------------------------------------------------------------------------------------Conclusions:   1. Normal myocardial perfusion scan, with no evidence of infarction or inducible ischemia.   2. The patient underwent stress testing using pharmacological IV regadenoson (bolus injection, 400mcg over 10 to 20 seconds followed by 5ml flush) protocol.      _ The total procedure time was 4 minutes . The test was stopped due to completion of protocol.      _ The peak heart rate was 109 bpm: 65 % of the patient's predicted maximal heart rate. The heart rate response was normal pharmocologic heart rate response.      _ There was a blunted pharmocologic blood pressure response with a maximum blood pressure of 113/71 mmHg.   3. Stress electrocardiogram: No significant ischemic ST segment changes.   4. Normal left ventricular regional wall motion.   5. The left ventricle is normal in function and normal in size. Normal left ventricular diastolic function. The post stress left ventricular EF is 65 %. The stress end diastolic volume is 63 ml and systolic volume is 22 ml.   6. Normal right ventricular function. There is no right ventricular dilation. RVEF = 70%; RVEDV = 89 mL; RVESV = 27 mL.    ---------------------------------------------------------------------------------------------------------------------------------------------------------    < end of copied text >

## 2024-05-28 NOTE — ED CDU PROVIDER SUBSEQUENT DAY NOTE - HISTORY
54 yo M w/ hx of HTN, HLD, GERD, parkinson's, presenting w/ c/o of shortness of breath and dizziness. He has been complaining fo this for 3 days. Pt also experiencing congestion and pain in his chest. Pt has been taking all his medications. While in ECG pt was looking all over, intermittently not answering tech so was brought to TRB. Pt's wife states for three days duration, patient has been having symptoms of chest discomfort, room spinning sensation. Two days prior to presentation, patient advocated shortness of breath, feeling like he was 'swallowing his teeth and tongue' lasting several hours. One day prior to presentation, patient had an episode of stiffness lasting several hours in duration. The patient advocated worsening shortness of breath and chest discomfort therefore presented to the ED for further workup. In the ED, code stroke was initiated on arrival. Neuro evaluated the patient and CTs were performed. CTs were unremarkable for acute findings. Neuro cleared patient that they were not concerned that his symptoms were related to stroke. Labs results were wnl. Plan is for patient to be transferred to CDU for continuous cardiac monitoring, stress test, and tele doc eval in the am.    Interval history: Patient continues to rest comfortably in CDU. Vitals have been stable throughout CDU stay. No significant events noted on continuous telemonitoring. Patient has no new complaints or concerns upon re-evaluation. Patient still pending continuous cardiac monitoring, stress test, and tele doc eval in the am.

## 2024-05-28 NOTE — ED CDU PROVIDER DISPOSITION NOTE - CLINICAL COURSE
52 yo M w/ hx of HTN, HLD, GERD, parkinson's, presenting w/ c/o of shortness of breath and dizziness. He has been complaining fo this for 3 days. Additional c/o congestion and pain in his chest.   In the ED, code stroke was initiated on arrival. Neuro evaluated the patient and CTs were performed. CTs were unremarkable for acute findings. Neuro cleared patient that they were not concerned that his symptoms were related to stroke. Labs results were wnl. Tele unremarkable, Stress impression normal perfusion scan and EF 65%.  patient feeling much improved. Patient seen by Dr. Griffith with cardiology recommendations .... 54 yo M w/ hx of HTN, HLD, GERD, parkinson's, presenting w/ c/o of shortness of breath and dizziness. He has been complaining fo this for 3 days. Additional c/o congestion and pain in his chest.   In the ED, code stroke was initiated on arrival. Neuro evaluated the patient and CTs were performed. CTs were unremarkable for acute findings. Neuro cleared patient that they were not concerned that his symptoms were related to stroke. Labs results were wnl. Tele unremarkable, Stress impression normal perfusion scan and EF 65%.  patient feeling much improved. Patient seen by Dr. Griffith with cardiology recommendations dc outpatient follow up provided to patient

## 2024-05-28 NOTE — ED CDU PROVIDER SUBSEQUENT DAY NOTE - ATTENDING APP SHARED VISIT CONTRIBUTION OF CARE
52 yo M w/ hx of HTN, HLD, GERD, parkinson's, presenting w/ c/o of shortness of breath and dizziness. He has been complaining fo this for 3 days. Pt also experiencing congestion and pain in his chest. Pt has been taking all his medications. While in ECG pt was looking all over, intermittently not answering tech so was brought to TRB. Pt's wife states for three days duration, patient has been having symptoms of chest discomfort, room spinning sensation. Two days prior to presentation, patient advocated shortness of breath, feeling like he was 'swallowing his teeth and tongue' lasting several hours. One day prior to presentation, patient had an episode of stiffness lasting several hours in duration. The patient advocated worsening shortness of breath and chest discomfort therefore presented to the ED for further workup. In the ED, code stroke was initiated on arrival. Neuro evaluated the patient and CTs were performed. CTs were unremarkable for acute findings. Neuro cleared patient that they were not concerned that his symptoms were related to stroke. Labs results were wnl. Plan is for patient to be transferred to CDU for continuous cardiac monitoring, stress test, and tele doc eval in the am.

## 2024-05-28 NOTE — ED CDU PROVIDER DISPOSITION NOTE - PATIENT PORTAL LINK FT
You can access the FollowMyHealth Patient Portal offered by [facility] by registering at the following website: [email or web address]. By joining [facility]’s FollowMyHealth portal, you will also be able to view your health information using other applications (apps) compatible with our system.

## 2024-06-05 ENCOUNTER — APPOINTMENT (OUTPATIENT)
Dept: PAIN MANAGEMENT | Facility: CLINIC | Age: 54
End: 2024-06-05

## 2024-06-13 ENCOUNTER — NON-APPOINTMENT (OUTPATIENT)
Age: 54
End: 2024-06-13

## 2024-06-13 ENCOUNTER — APPOINTMENT (OUTPATIENT)
Dept: CARDIOLOGY | Facility: CLINIC | Age: 54
End: 2024-06-13
Payer: MEDICAID

## 2024-06-13 VITALS — SYSTOLIC BLOOD PRESSURE: 112 MMHG | HEART RATE: 83 BPM | DIASTOLIC BLOOD PRESSURE: 72 MMHG | OXYGEN SATURATION: 100 %

## 2024-06-13 DIAGNOSIS — Z13.6 ENCOUNTER FOR SCREENING FOR CARDIOVASCULAR DISORDERS: ICD-10-CM

## 2024-06-13 DIAGNOSIS — R06.09 OTHER FORMS OF DYSPNEA: ICD-10-CM

## 2024-06-13 DIAGNOSIS — R07.9 CHEST PAIN, UNSPECIFIED: ICD-10-CM

## 2024-06-13 PROCEDURE — 99214 OFFICE O/P EST MOD 30 MIN: CPT | Mod: 25

## 2024-06-13 PROCEDURE — 93000 ELECTROCARDIOGRAM COMPLETE: CPT

## 2024-06-13 PROCEDURE — G2211 COMPLEX E/M VISIT ADD ON: CPT | Mod: NC

## 2024-06-17 ENCOUNTER — APPOINTMENT (OUTPATIENT)
Dept: NEUROLOGY | Facility: CLINIC | Age: 54
End: 2024-06-17
Payer: MEDICAID

## 2024-06-17 VITALS
DIASTOLIC BLOOD PRESSURE: 70 MMHG | WEIGHT: 143 LBS | HEIGHT: 64 IN | BODY MASS INDEX: 24.41 KG/M2 | HEART RATE: 79 BPM | SYSTOLIC BLOOD PRESSURE: 113 MMHG

## 2024-06-17 DIAGNOSIS — R49.8 OTHER VOICE AND RESONANCE DISORDERS: ICD-10-CM

## 2024-06-17 DIAGNOSIS — F41.9 ANXIETY DISORDER, UNSPECIFIED: ICD-10-CM

## 2024-06-17 PROCEDURE — 99214 OFFICE O/P EST MOD 30 MIN: CPT

## 2024-06-17 RX ORDER — CARBIDOPA AND LEVODOPA 25; 100 MG/1; MG/1
25-100 TABLET ORAL
Qty: 900 | Refills: 3 | Status: ACTIVE | COMMUNITY
Start: 2021-12-07 | End: 1900-01-01

## 2024-06-17 RX ORDER — MIRTAZAPINE 30 MG/1
30 TABLET, FILM COATED ORAL
Qty: 90 | Refills: 3 | Status: ACTIVE | COMMUNITY
Start: 2024-03-14 | End: 1900-01-01

## 2024-06-17 RX ORDER — LEVODOPA AND CARBIDOPA 145; 36.25 MG/1; MG/1
36.25-145 CAPSULE, EXTENDED RELEASE ORAL
Qty: 300 | Refills: 3 | Status: ACTIVE | COMMUNITY
Start: 2024-03-14 | End: 1900-01-01

## 2024-06-17 RX ORDER — LEVODOPA AND CARBIDOPA 95; 23.75 MG/1; MG/1
23.75-95 CAPSULE, EXTENDED RELEASE ORAL
Qty: 150 | Refills: 0 | Status: DISCONTINUED | COMMUNITY
Start: 2023-07-18 | End: 2024-06-17

## 2024-06-17 RX ORDER — PRAMIPEXOLE DIHYDROCHLORIDE 0.25 MG/1
0.25 TABLET ORAL 3 TIMES DAILY
Qty: 90 | Refills: 5 | Status: DISCONTINUED | COMMUNITY
Start: 2023-04-13 | End: 2024-06-17

## 2024-06-17 NOTE — PHYSICAL EXAM
[General Appearance - Alert] : alert [Oriented To Time, Place, And Person] : oriented to person, place, and time [FreeTextEntry1] : There is mild masking.  Speech is 2+.  EOMI.   Tremor is absent. There is 1+ R>L bradykinesia with trace cogwheeling. Dyskinesia in the rt side noted. Stands with arms crossed, normal gait initiation, normal stride length, normal turns. absent right arm swing. Pull test negative.

## 2024-06-17 NOTE — DISCUSSION/SUMMARY
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 Increased anxiety and depression with sleep fragmentation.  Patient was counseled on the following recommendations: -Continue Sinemet 2 tab every 3 hours along with Rytary 145 for each dose  -Increase Mirtazapine 30mg at bedtime - take MiraLAX every day to avoid constipation -Refer to psychiatry -Refer to PT and SLP for speech therapy  f/u in 3 months

## 2024-06-17 NOTE — HISTORY OF PRESENT ILLNESS
[FreeTextEntry1] : Patient here with wife and eldest daughter PDx 5 years now on rytary he feels better with the movements. Stopped entacapone which helped his headaches now complains of facial pain and numbness Went to the hospital on May 27th for SOB at [facility]. Following up with cardiology. She states in the afternoon he has SOB and runny nose.  Speech sounds more difficult to understand.  Nonmotor:  Sleep is fragmented on mirtazapine, needs zolpidem occasionally  +constipation - severe  BM every other day. Uses miralax prn  +more frustrated and stressed. exercises sometimes  +anxiety and depression  Meds sinemet  2 tabs every 3 and half hour 6-9:30-1-4:30-8:30 rytary 145 one capsule 5x per day ambien 5mg qhs prn finasteride 5mg  protonix  Mirtazapine 15mg   Prior  selegiline mirapex neupro - not covered trazodone

## 2024-06-21 ENCOUNTER — EMERGENCY (EMERGENCY)
Facility: HOSPITAL | Age: 54
LOS: 1 days | Discharge: HOME | End: 2024-06-21
Attending: STUDENT IN AN ORGANIZED HEALTH CARE EDUCATION/TRAINING PROGRAM | Admitting: STUDENT IN AN ORGANIZED HEALTH CARE EDUCATION/TRAINING PROGRAM
Payer: MEDICAID

## 2024-06-21 VITALS
HEART RATE: 95 BPM | TEMPERATURE: 98 F | OXYGEN SATURATION: 100 % | WEIGHT: 143.08 LBS | HEIGHT: 63 IN | SYSTOLIC BLOOD PRESSURE: 139 MMHG | DIASTOLIC BLOOD PRESSURE: 78 MMHG | RESPIRATION RATE: 24 BRPM

## 2024-06-21 PROCEDURE — 93010 ELECTROCARDIOGRAM REPORT: CPT

## 2024-06-21 PROCEDURE — 99285 EMERGENCY DEPT VISIT HI MDM: CPT | Mod: 25

## 2024-06-21 NOTE — ED ADULT TRIAGE NOTE - WEIGHT IN LBS
CC.  Respiratory failure  HPI.  Patient is obtunded.  Unable to obtain ros/HX    Vital Signs Last 24 Hrs  T(C): 35.6 (02 Jan 2021 14:47), Max: 35.6 (02 Jan 2021 14:47)  T(F): 96 (02 Jan 2021 14:47), Max: 96 (02 Jan 2021 14:47)  HR: 76 (03 Jan 2021 07:58) (76 - 81)  BP: 86/46 (03 Jan 2021 07:14) (73/49 - 91/50)  BP(mean): --  RR: 16 (02 Jan 2021 14:47) (16 - 16)  SpO2: 98% (03 Jan 2021 07:58) (98% - 98%)      PHYSICAL EXAM-  GENERAL:  chronic ill appearing female  HEAD:  Atraumatic, Normocephalic  EYES: EOMI, PERRLA, conjunctiva and sclera clear  NECK: Supple, No JVD, Normal thyroid  NERVOUS SYSTEM:  obtunded unable to asses   CHEST/LUNG: diffuse rhonchi with good air entry.  no retractions or accessory muscle usage  HEART: Regular rate and rhythm; No murmurs, rubs, or gallops  ABDOMEN: Soft, Nontender, Nondistended; Bowel sounds present  EXTREMITIES:   No clubbing, cyanosis   SKIN: No rashes or lesions           MEDICATIONS  (STANDING):  dextrose 5% + sodium chloride 0.9%. 1000 milliLiter(s) (60 mL/Hr) IV Continuous <Continuous>  erythromycin   Ointment 1 Application(s) Both EYES two times a day  heparin   Injectable 5000 Unit(s) SubCutaneous every 12 hours    MEDICATIONS  (PRN):  morphine  - Injectable 2 milliGRAM(s) IV Push every 4 hours PRN Moderate Pain (4 - 6)        Imaging Personally Reviewed:     [x ] YES  [ ] NO    Consultant(s) Notes Reviewed:  [x ] YES  [ ] NO    Care Discussed with Consultants/Other Providers [x ] YES  [ ] No medical contraindication for discharge           143

## 2024-06-21 NOTE — ED ADULT TRIAGE NOTE - CHIEF COMPLAINT QUOTE
Pt st " I have chest pain and short of breath."Pt appears anxious unable to talk in complete sentences  hx of Panic attacks, htn , parkinsons

## 2024-06-21 NOTE — ED ADULT TRIAGE NOTE - MEANS OF ARRIVAL
"Preventive Care Visit  [facility]  Arben hSort MD, Family Medicine  Mar 1, 2024    Assessment & Plan     Routine general medical examination at a health care facility  Here for physical, doing well overall.  Vaginal discharge as below.  Counseling provided.  Did discuss healthy lifestyle modifications, also would discussed family-planning.  Due for Pap smear, she would like to return for separate visit to complete.  - Follow-up for PAP  - Follow-up in 1 year or earlier as needed    Vaginal discharge  Few days of white vaginal discharge.  History of recurrent bacterial vaginosis.  Wet prep today in clinic was within normal limits.  May be more physiologic.  We did discuss ways to help limit BV.  Likely needs additional evaluation in the future with history of recurrent bacterial vaginosis.  May need to consider suppressive therapy.  - Wet preparation  - Chlamydia trachomatis/Neisseria gonorrhoeae by PCR - Clinic Collect    Screen for STD (sexually transmitted disease)  Vaginal discharge as above.  Routine screening.  - Wet preparation  - Chlamydia trachomatis/Neisseria gonorrhoeae by PCR - Clinic Collect  - Syphilis Screen Cascade (RPR/VDRL)  - HIV Antigen Antibody Combo Cascade  - Syphilis Screen Cascade (RPR/VDRL)  - HIV Antigen Antibody Combo Cascade    Screening for diabetes mellitus  Normal A1c  - Hemoglobin A1c          BMI  Estimated body mass index is 40.66 kg/m  as calculated from the following:    Height as of this encounter: 1.67 m (5' 5.75\").    Weight as of this encounter: 113.4 kg (250 lb).   Weight management plan: Discussed healthy diet and exercise guidelines    Counseling  Appropriate preventive services were discussed with this patient, including applicable screening as appropriate for nutrition, physical activity,        Return in about 53 weeks (around 3/7/2025) for Annual Wellness Visit.    Guerrero Packer is a 34 year old, presenting for the " following:  Annual Visit        3/1/2024     3:05 PM   Additional Questions   Roomed by henrry wilcxo         3/1/2024    Information    services provided? No        Health Care Directive  Patient does not have a Health Care Directive or Living Will: Not discussed today    HPI  Vaginal discharge  - Started a couple of days ago   - White discharge  - Non odorus   - No itchyness  - Last intercourse last week   - Same partners  - No condom use   - No contraceptive   - Not planning to have children, not intersted in birthcontrol  - LMP 02/11/24 - regular (28 days)  - No dysuia or hematuria.     - History of recurrent BV. Has about 4 episodes per year.   - Not using lubricant with intercourse  - Not douching        3/1/2024   General Health   How would you rate your overall physical health? Good   Feel stress (tense, anxious, or unable to sleep) Not at all         3/1/2024   Nutrition   Three or more servings of calcium each day? Yes   Diet: Regular (no restrictions)   How many servings of fruit and vegetables per day? (!) 0-1   How many sweetened beverages each day? (!) 2, Pop can          3/1/2024   Exercise   Days per week of moderate/strenous exercise 1 day   (!) EXERCISE CONCERN      3/1/2024   Social Factors   Frequency of gathering with friends or relatives Twice a week   Worry food won't last until get money to buy more No   Food not last or not have enough money for food? No   Do you have housing?  Yes   Are you worried about losing your housing? No   Lack of transportation? No   Unable to get utilities (heat,electricity)? No         3/1/2024   Dental   Dentist two times every year? Yes         3/1/2024   TB Screening   Were you born outside of [place]?  No         Today's PHQ-2 Score:       3/1/2024     3:06 PM   PHQ-2 ( 1999 [employer])   Q1: Little interest or pleasure in doing things 0   Q2: Feeling down, depressed or hopeless 0   PHQ-2 Score 0           3/1/2024   Substance Use   Alcohol more  "than 3/day or more than 7/wk No   Do you use any other substances recreationally? No     Social History     Tobacco Use    Smoking status: Never    Smokeless tobacco: Never   Substance Use Topics    Alcohol use: Yes     Comment: Rare    Drug use: Never          Mammogram Screening - Patient under 40 years of age: Routine Mammogram Screening not recommended.         3/1/2024   STI Screening   New sexual partner(s) since last STI/HIV test? No     History of abnormal Pap smear: NO - age 30-65 PAP every 5 years with negative HPV co-testing recommended. She deferred cervical cancer screening today, will return in the next 1-2 months for PAP.                      3/1/2024   Contraception/Family Planning   Questions about contraception or family planning No       Reviewed and updated as needed this visit by Provider                         Objective    Exam  /81   Pulse 94   Temp 98.1  F (36.7  C)   Ht 1.67 m (5' 5.75\")   Wt 113.4 kg (250 lb)   LMP 02/11/2024 (Approximate)   SpO2 97%   BMI 40.66 kg/m     Estimated body mass index is 40.66 kg/m  as calculated from the following:    Height as of this encounter: 1.67 m (5' 5.75\").    Weight as of this encounter: 113.4 kg (250 lb).    Physical Exam  GENERAL: alert and no distress  EYES: Eyes grossly normal to inspection, PERRL and conjunctivae and sclerae normal  HENT: ear canals impacted with cerumen.  Unable to visualize TM completely.  NECK: no adenopathy, no asymmetry, masses, or scars  RESP: lungs clear to auscultation - no rales, rhonchi or wheezes  CV: regular rate and rhythm, normal S1 S2, no S3 or S4, no murmur, click or rub, no peripheral edema  ABDOMEN: soft, nontender, no hepatosplenomegaly, no masses and bowel sounds normal  MS: no gross musculoskeletal defects noted, no edema  SKIN: no suspicious lesions or rashes.  Acanthosis nigricans present on the neck.  NEURO: Normal strength and tone, mentation intact and speech normal  PSYCH: mentation appears " normal, affect normal/bright      Signed Electronically by: Arben Short MD     ambulatory

## 2024-06-22 VITALS
SYSTOLIC BLOOD PRESSURE: 118 MMHG | HEART RATE: 88 BPM | OXYGEN SATURATION: 100 % | DIASTOLIC BLOOD PRESSURE: 74 MMHG | TEMPERATURE: 98 F | RESPIRATION RATE: 18 BRPM

## 2024-06-22 LAB
ALBUMIN SERPL ELPH-MCNC: 4.2 G/DL — SIGNIFICANT CHANGE UP (ref 3.3–5)
ALP SERPL-CCNC: 66 U/L — SIGNIFICANT CHANGE UP (ref 40–120)
ALT FLD-CCNC: 5 U/L — SIGNIFICANT CHANGE UP (ref 4–41)
ANION GAP SERPL CALC-SCNC: 17 MMOL/L — HIGH (ref 7–14)
AST SERPL-CCNC: 17 U/L — SIGNIFICANT CHANGE UP (ref 4–40)
BASE EXCESS BLDV CALC-SCNC: 1.6 MMOL/L — SIGNIFICANT CHANGE UP (ref -2–3)
BASOPHILS # BLD AUTO: 0.01 K/UL — SIGNIFICANT CHANGE UP (ref 0–0.2)
BASOPHILS NFR BLD AUTO: 0.2 % — SIGNIFICANT CHANGE UP (ref 0–2)
BILIRUB SERPL-MCNC: 0.6 MG/DL — SIGNIFICANT CHANGE UP (ref 0.2–1.2)
BLOOD GAS VENOUS COMPREHENSIVE RESULT: SIGNIFICANT CHANGE UP
BUN SERPL-MCNC: 8 MG/DL — SIGNIFICANT CHANGE UP (ref 7–23)
CALCIUM SERPL-MCNC: 9.1 MG/DL — SIGNIFICANT CHANGE UP (ref 8.4–10.5)
CHLORIDE BLDV-SCNC: 97 MMOL/L — SIGNIFICANT CHANGE UP (ref 96–108)
CHLORIDE SERPL-SCNC: 98 MMOL/L — SIGNIFICANT CHANGE UP (ref 98–107)
CO2 BLDV-SCNC: 25.6 MMOL/L — SIGNIFICANT CHANGE UP (ref 22–26)
CO2 SERPL-SCNC: 21 MMOL/L — LOW (ref 22–31)
CREAT SERPL-MCNC: 0.69 MG/DL — SIGNIFICANT CHANGE UP (ref 0.5–1.3)
EGFR: 112 ML/MIN/1.73M2 — SIGNIFICANT CHANGE UP
EOSINOPHIL # BLD AUTO: 0.04 K/UL — SIGNIFICANT CHANGE UP (ref 0–0.5)
EOSINOPHIL NFR BLD AUTO: 0.7 % — SIGNIFICANT CHANGE UP (ref 0–6)
GAS PNL BLDV: 131 MMOL/L — LOW (ref 136–145)
GLUCOSE BLDV-MCNC: 121 MG/DL — HIGH (ref 70–99)
GLUCOSE SERPL-MCNC: 125 MG/DL — HIGH (ref 70–99)
HCO3 BLDV-SCNC: 25 MMOL/L — SIGNIFICANT CHANGE UP (ref 22–29)
HCT VFR BLD CALC: 35.4 % — LOW (ref 39–50)
HCT VFR BLDA CALC: 39 % — SIGNIFICANT CHANGE UP (ref 39–51)
HGB BLD CALC-MCNC: 13 G/DL — SIGNIFICANT CHANGE UP (ref 12.6–17.4)
HGB BLD-MCNC: 12.7 G/DL — LOW (ref 13–17)
IANC: 3.9 K/UL — SIGNIFICANT CHANGE UP (ref 1.8–7.4)
IMM GRANULOCYTES NFR BLD AUTO: 0.2 % — SIGNIFICANT CHANGE UP (ref 0–0.9)
LACTATE BLDV-MCNC: 3.8 MMOL/L — HIGH (ref 0.5–2)
LYMPHOCYTES # BLD AUTO: 1.21 K/UL — SIGNIFICANT CHANGE UP (ref 1–3.3)
LYMPHOCYTES # BLD AUTO: 21.6 % — SIGNIFICANT CHANGE UP (ref 13–44)
MCHC RBC-ENTMCNC: 31 PG — SIGNIFICANT CHANGE UP (ref 27–34)
MCHC RBC-ENTMCNC: 35.9 GM/DL — SIGNIFICANT CHANGE UP (ref 32–36)
MCV RBC AUTO: 86.3 FL — SIGNIFICANT CHANGE UP (ref 80–100)
MONOCYTES # BLD AUTO: 0.44 K/UL — SIGNIFICANT CHANGE UP (ref 0–0.9)
MONOCYTES NFR BLD AUTO: 7.8 % — SIGNIFICANT CHANGE UP (ref 2–14)
NEUTROPHILS # BLD AUTO: 3.9 K/UL — SIGNIFICANT CHANGE UP (ref 1.8–7.4)
NEUTROPHILS NFR BLD AUTO: 69.5 % — SIGNIFICANT CHANGE UP (ref 43–77)
NRBC # BLD: 0 /100 WBCS — SIGNIFICANT CHANGE UP (ref 0–0)
NRBC # FLD: 0 K/UL — SIGNIFICANT CHANGE UP (ref 0–0)
PCO2 BLDV: 33 MMHG — LOW (ref 42–55)
PH BLDV: 7.48 — HIGH (ref 7.32–7.43)
PLATELET # BLD AUTO: 230 K/UL — SIGNIFICANT CHANGE UP (ref 150–400)
PO2 BLDV: 31 MMHG — SIGNIFICANT CHANGE UP (ref 25–45)
POTASSIUM BLDV-SCNC: 2.9 MMOL/L — CRITICAL LOW (ref 3.5–5.1)
POTASSIUM SERPL-MCNC: 3.1 MMOL/L — LOW (ref 3.5–5.3)
POTASSIUM SERPL-SCNC: 3.1 MMOL/L — LOW (ref 3.5–5.3)
PROT SERPL-MCNC: 7.2 G/DL — SIGNIFICANT CHANGE UP (ref 6–8.3)
RBC # BLD: 4.1 M/UL — LOW (ref 4.2–5.8)
RBC # FLD: 12.6 % — SIGNIFICANT CHANGE UP (ref 10.3–14.5)
SAO2 % BLDV: 48.7 % — LOW (ref 67–88)
SODIUM SERPL-SCNC: 136 MMOL/L — SIGNIFICANT CHANGE UP (ref 135–145)
TROPONIN T, HIGH SENSITIVITY RESULT: 8 NG/L — SIGNIFICANT CHANGE UP
TROPONIN T, HIGH SENSITIVITY RESULT: 8 NG/L — SIGNIFICANT CHANGE UP
WBC # BLD: 5.61 K/UL — SIGNIFICANT CHANGE UP (ref 3.8–10.5)
WBC # FLD AUTO: 5.61 K/UL — SIGNIFICANT CHANGE UP (ref 3.8–10.5)

## 2024-06-22 PROCEDURE — 71046 X-RAY EXAM CHEST 2 VIEWS: CPT | Mod: 26

## 2024-06-22 RX ORDER — POTASSIUM CHLORIDE 20 MEQ
40 PACKET (EA) ORAL ONCE
Refills: 0 | Status: COMPLETED | OUTPATIENT
Start: 2024-06-22 | End: 2024-06-22

## 2024-06-22 RX ADMIN — Medication 1 MILLIGRAM(S): at 01:24

## 2024-06-22 RX ADMIN — Medication 40 MILLIEQUIVALENT(S): at 03:07

## 2024-06-22 NOTE — ED PROVIDER NOTE - PROGRESS NOTE DETAILS
Radha Bush MD PGY3: Patient symptoms resolved after ativan. Patient ambulating well in ED. Patient now sleeping. Previous admission, patient had normal stress test. Will discharge with PCP f/u. Patient and family aware of plan.

## 2024-06-22 NOTE — ED ADULT NURSE NOTE - OBJECTIVE STATEMENT
Break RN: 52yo male received in spot 1B. Hx of Parkinson's disease, panic attack,  HTN, HTN, GERD, per family patient has been having intermittent episode of shortness of breath, chest pain and shakiness x 1 month with each episode lasting 3-4 hours at time. Had stress test three weeks ago was negative. 20G IV placed to LAC, lab drawn and sent. NSR on monitor. Medicated as per ordered. Pending lab result and imaging result.

## 2024-06-22 NOTE — ED PROVIDER NOTE - PATIENT PORTAL LINK FT
You can access the FollowMyHealth Patient Portal offered by [facility] by registering at the following website: [email or web address]. By joining [facility]’s FollowMyHealth portal, you will also be able to view your health information using other applications (apps) compatible with our system.

## 2024-06-22 NOTE — ED PROVIDER NOTE - NSFOLLOWUPINSTRUCTIONS_ED_ALL_ED_FT
You were evaluated in the Emergency Department.    Based on your evaluation:    There are no signs of emergency conditions requiring admission to the hospital on today's workup.  Based on the evaluation, a presumptive diagnosis was made, however, further evaluation may be required by your primary care physician or a specialist for a more definitive diagnosis.  Therefore, please follow-up as directed or return to the Emergency Department if your symptoms change or worsen.    We recommend that you:  1. See your primary care physician within the next 72 hours for follow up.  Bring a copy of your discharge paperwork (including any test results) to your doctor.  2. Continue home medications as prescribed.       *** Return immediately if you have worsening symptoms. ***

## 2024-06-22 NOTE — ED ADULT NURSE NOTE - NSFALLRISKINTERV_ED_ALL_ED

## 2024-06-22 NOTE — ED PROVIDER NOTE - ATTENDING CONTRIBUTION TO CARE
53-year-old male with known history of Parkinson's disease, hypertension, hyperlipidemia, GERD, insomnia on mirtazapine coming in with shortness of breath and shakiness.  As per the family at the bedside for past 1 month patient has intermittent episodes lasting for 3 to 4 hours.  Patient complains of shortness of breath, congestion, feels like his body is tightening.  During these episodes patient looks around and is moving his hands around.  Patient was sent here 3 weeks ago for the symptoms when they first started–had a stress test that was nonactionable.  Patient also had a code stroke called at that time, neurology did not have any further recommendations as the scans were negative.  Was recommended to continue taking his Parkinson medications.  Also complaining of ongoing insomnia that is only sleeping about 3 to 4 hours/day.  He is able to answer all the questions and is directable.  Patient is nontoxic-appearing.  No respiratory distress.  Patient intermittently moves his upper extremities around and starts looking around however able to make conversations during these episodes.  Answering all the questions appropriately.  No nystagmus noted.  Visual fields intact.  Differential diagnoses include but not limited to electrolyte abnormalities, anemia, pneumonia, CO2 retention.  Patient's vital signs are stable, satting 100% on room air.  Symptoms are not consistent with a seizure.

## 2024-07-07 ENCOUNTER — EMERGENCY (EMERGENCY)
Facility: HOSPITAL | Age: 54
LOS: 1 days | Discharge: HOME | End: 2024-07-07
Attending: EMERGENCY MEDICINE | Admitting: EMERGENCY MEDICINE
Payer: MEDICAID

## 2024-07-07 VITALS
DIASTOLIC BLOOD PRESSURE: 63 MMHG | TEMPERATURE: 98 F | OXYGEN SATURATION: 99 % | SYSTOLIC BLOOD PRESSURE: 113 MMHG | HEART RATE: 72 BPM | RESPIRATION RATE: 15 BRPM

## 2024-07-07 VITALS
OXYGEN SATURATION: 98 % | HEART RATE: 105 BPM | RESPIRATION RATE: 18 BRPM | TEMPERATURE: 97 F | DIASTOLIC BLOOD PRESSURE: 79 MMHG | SYSTOLIC BLOOD PRESSURE: 159 MMHG | HEIGHT: 63 IN | WEIGHT: 160.06 LBS

## 2024-07-07 LAB
ALBUMIN SERPL ELPH-MCNC: 4.3 G/DL — SIGNIFICANT CHANGE UP (ref 3.3–5)
ALP SERPL-CCNC: 74 U/L — SIGNIFICANT CHANGE UP (ref 40–120)
ALT FLD-CCNC: 5 U/L — SIGNIFICANT CHANGE UP (ref 4–41)
ANION GAP SERPL CALC-SCNC: 14 MMOL/L — SIGNIFICANT CHANGE UP (ref 7–14)
APTT BLD: 33 SEC — SIGNIFICANT CHANGE UP (ref 24.5–35.6)
AST SERPL-CCNC: 15 U/L — SIGNIFICANT CHANGE UP (ref 4–40)
BASE EXCESS BLDV CALC-SCNC: 1 MMOL/L — SIGNIFICANT CHANGE UP (ref -2–3)
BASOPHILS # BLD AUTO: 0.01 K/UL — SIGNIFICANT CHANGE UP (ref 0–0.2)
BASOPHILS NFR BLD AUTO: 0.2 % — SIGNIFICANT CHANGE UP (ref 0–2)
BILIRUB SERPL-MCNC: 0.8 MG/DL — SIGNIFICANT CHANGE UP (ref 0.2–1.2)
BLD GP AB SCN SERPL QL: NEGATIVE — SIGNIFICANT CHANGE UP
BLOOD GAS VENOUS COMPREHENSIVE RESULT: SIGNIFICANT CHANGE UP
BUN SERPL-MCNC: 10 MG/DL — SIGNIFICANT CHANGE UP (ref 7–23)
CALCIUM SERPL-MCNC: 8.8 MG/DL — SIGNIFICANT CHANGE UP (ref 8.4–10.5)
CHLORIDE BLDV-SCNC: 96 MMOL/L — SIGNIFICANT CHANGE UP (ref 96–108)
CHLORIDE SERPL-SCNC: 97 MMOL/L — LOW (ref 98–107)
CO2 BLDV-SCNC: 26.4 MMOL/L — HIGH (ref 22–26)
CO2 SERPL-SCNC: 21 MMOL/L — LOW (ref 22–31)
CREAT SERPL-MCNC: 0.63 MG/DL — SIGNIFICANT CHANGE UP (ref 0.5–1.3)
EGFR: 115 ML/MIN/1.73M2 — SIGNIFICANT CHANGE UP
EOSINOPHIL # BLD AUTO: 0.06 K/UL — SIGNIFICANT CHANGE UP (ref 0–0.5)
EOSINOPHIL NFR BLD AUTO: 1.1 % — SIGNIFICANT CHANGE UP (ref 0–6)
GAS PNL BLDV: 128 MMOL/L — LOW (ref 136–145)
GLUCOSE BLDV-MCNC: 120 MG/DL — HIGH (ref 70–99)
GLUCOSE SERPL-MCNC: 113 MG/DL — HIGH (ref 70–99)
HCO3 BLDV-SCNC: 25 MMOL/L — SIGNIFICANT CHANGE UP (ref 22–29)
HCT VFR BLD CALC: 36.4 % — LOW (ref 39–50)
HCT VFR BLDA CALC: 39 % — SIGNIFICANT CHANGE UP (ref 39–51)
HGB BLD CALC-MCNC: 13.1 G/DL — SIGNIFICANT CHANGE UP (ref 12.6–17.4)
HGB BLD-MCNC: 12.8 G/DL — LOW (ref 13–17)
IANC: 4.28 K/UL — SIGNIFICANT CHANGE UP (ref 1.8–7.4)
IMM GRANULOCYTES NFR BLD AUTO: 0.4 % — SIGNIFICANT CHANGE UP (ref 0–0.9)
INR BLD: 0.99 RATIO — SIGNIFICANT CHANGE UP (ref 0.85–1.18)
LACTATE BLDV-MCNC: 1.7 MMOL/L — SIGNIFICANT CHANGE UP (ref 0.5–2)
LIDOCAIN IGE QN: 38 U/L — SIGNIFICANT CHANGE UP (ref 7–60)
LYMPHOCYTES # BLD AUTO: 0.87 K/UL — LOW (ref 1–3.3)
LYMPHOCYTES # BLD AUTO: 15.5 % — SIGNIFICANT CHANGE UP (ref 13–44)
MAGNESIUM SERPL-MCNC: 2.1 MG/DL — SIGNIFICANT CHANGE UP (ref 1.6–2.6)
MCHC RBC-ENTMCNC: 30.4 PG — SIGNIFICANT CHANGE UP (ref 27–34)
MCHC RBC-ENTMCNC: 35.2 GM/DL — SIGNIFICANT CHANGE UP (ref 32–36)
MCV RBC AUTO: 86.5 FL — SIGNIFICANT CHANGE UP (ref 80–100)
MONOCYTES # BLD AUTO: 0.36 K/UL — SIGNIFICANT CHANGE UP (ref 0–0.9)
MONOCYTES NFR BLD AUTO: 6.4 % — SIGNIFICANT CHANGE UP (ref 2–14)
NEUTROPHILS # BLD AUTO: 4.28 K/UL — SIGNIFICANT CHANGE UP (ref 1.8–7.4)
NEUTROPHILS NFR BLD AUTO: 76.4 % — SIGNIFICANT CHANGE UP (ref 43–77)
NRBC # BLD: 0 /100 WBCS — SIGNIFICANT CHANGE UP (ref 0–0)
NRBC # FLD: 0 K/UL — SIGNIFICANT CHANGE UP (ref 0–0)
PCO2 BLDV: 38 MMHG — LOW (ref 42–55)
PH BLDV: 7.43 — SIGNIFICANT CHANGE UP (ref 7.32–7.43)
PHOSPHATE SERPL-MCNC: 1.5 MG/DL — LOW (ref 2.5–4.5)
PLATELET # BLD AUTO: 200 K/UL — SIGNIFICANT CHANGE UP (ref 150–400)
PO2 BLDV: 25 MMHG — SIGNIFICANT CHANGE UP (ref 25–45)
POTASSIUM BLDV-SCNC: 3.1 MMOL/L — LOW (ref 3.5–5.1)
POTASSIUM SERPL-MCNC: 3.3 MMOL/L — LOW (ref 3.5–5.3)
POTASSIUM SERPL-SCNC: 3.3 MMOL/L — LOW (ref 3.5–5.3)
PROT SERPL-MCNC: 7.3 G/DL — SIGNIFICANT CHANGE UP (ref 6–8.3)
PROTHROM AB SERPL-ACNC: 11.2 SEC — SIGNIFICANT CHANGE UP (ref 9.5–13)
RBC # BLD: 4.21 M/UL — SIGNIFICANT CHANGE UP (ref 4.2–5.8)
RBC # FLD: 12.5 % — SIGNIFICANT CHANGE UP (ref 10.3–14.5)
RH IG SCN BLD-IMP: POSITIVE — SIGNIFICANT CHANGE UP
SAO2 % BLDV: 45.8 % — LOW (ref 67–88)
SODIUM SERPL-SCNC: 132 MMOL/L — LOW (ref 135–145)
TROPONIN T, HIGH SENSITIVITY RESULT: 8 NG/L — SIGNIFICANT CHANGE UP
WBC # BLD: 5.6 K/UL — SIGNIFICANT CHANGE UP (ref 3.8–10.5)
WBC # FLD AUTO: 5.6 K/UL — SIGNIFICANT CHANGE UP (ref 3.8–10.5)

## 2024-07-07 PROCEDURE — 71045 X-RAY EXAM CHEST 1 VIEW: CPT | Mod: 26

## 2024-07-07 PROCEDURE — 99285 EMERGENCY DEPT VISIT HI MDM: CPT

## 2024-07-07 PROCEDURE — 93010 ELECTROCARDIOGRAM REPORT: CPT

## 2024-07-07 RX ORDER — LORAZEPAM 0.5 MG
1 TABLET ORAL
Qty: 5 | Refills: 0
Start: 2024-07-07 | End: 2024-07-11

## 2024-07-07 RX ORDER — LORAZEPAM 0.5 MG
0.5 TABLET ORAL ONCE
Refills: 0 | Status: DISCONTINUED | OUTPATIENT
Start: 2024-07-07 | End: 2024-07-07

## 2024-07-07 RX ORDER — POTASSIUM CHLORIDE 600 MG/1
40 TABLET, FILM COATED, EXTENDED RELEASE ORAL ONCE
Refills: 0 | Status: COMPLETED | OUTPATIENT
Start: 2024-07-07 | End: 2024-07-07

## 2024-07-07 RX ADMIN — POTASSIUM CHLORIDE 40 MILLIEQUIVALENT(S): 600 TABLET, FILM COATED, EXTENDED RELEASE ORAL at 19:17

## 2024-07-07 RX ADMIN — Medication 0.5 MILLIGRAM(S): at 17:37

## 2024-07-23 ENCOUNTER — APPOINTMENT (OUTPATIENT)
Dept: CT IMAGING | Facility: CLINIC | Age: 54
End: 2024-07-23

## 2024-07-24 ENCOUNTER — APPOINTMENT (OUTPATIENT)
Dept: UROLOGY | Facility: CLINIC | Age: 54
End: 2024-07-24
Payer: MEDICAID

## 2024-07-24 VITALS
SYSTOLIC BLOOD PRESSURE: 95 MMHG | HEIGHT: 64 IN | OXYGEN SATURATION: 100 % | HEART RATE: 71 BPM | DIASTOLIC BLOOD PRESSURE: 64 MMHG | TEMPERATURE: 97.1 F

## 2024-07-24 DIAGNOSIS — Z92.3 PERSONAL HISTORY OF IRRADIATION: ICD-10-CM

## 2024-07-24 DIAGNOSIS — C61 MALIGNANT NEOPLASM OF PROSTATE: ICD-10-CM

## 2024-07-24 PROCEDURE — G2211 COMPLEX E/M VISIT ADD ON: CPT | Mod: NC

## 2024-07-24 PROCEDURE — 99213 OFFICE O/P EST LOW 20 MIN: CPT

## 2024-07-24 RX ORDER — DULOXETINE HYDROCHLORIDE 30 MG/1
CAPSULE, DELAYED RELEASE ORAL
Refills: 0 | Status: ACTIVE | COMMUNITY

## 2024-07-24 NOTE — HISTORY OF PRESENT ILLNESS
[None] : None [FreeTextEntry1] : Mr. Canales is a very pleasant 53 year old man here today for history of prostate cancer s/p radiation. He reports feeling well since he was here last. He continues to take tamsulosin daily. He reports that if he misses a dose he notices a big difference in his urination. Takes it in the evening. Denies any hematuria or dysuria.

## 2024-07-24 NOTE — ASSESSMENT
[FreeTextEntry1] : Mr. Canales is a very pleasant 53 year old man here today for history of prostate cancer s/p radiation. He reports feeling well since he was here last. He continues to take tamsulosin daily. He reports that if he misses a dose he notices a big difference in his urination. Takes it in the evening. Denies any hematuria or dysuria. PSA 04/24/24 4.64, down from 4.72. Continue tamsulosin - refills sent to pharmacy. PSA today RTO in 3 months.

## 2024-07-25 LAB — PSA SERPL-MCNC: 5.28 NG/ML

## 2024-08-06 ENCOUNTER — APPOINTMENT (OUTPATIENT)
Dept: NEUROLOGY | Facility: CLINIC | Age: 54
End: 2024-08-06

## 2024-08-06 PROCEDURE — 99214 OFFICE O/P EST MOD 30 MIN: CPT

## 2024-08-06 NOTE — HISTORY OF PRESENT ILLNESS
[FreeTextEntry1] : Patient reports that meds work well when he takes it on an empty stomach Meat or fish affects LD kinetics considerably; as the day progresses ON periods are more delayed Due to anxiety, he went  to ED several times. He is now following with a psychiatrist who added duloxetine 20mg with improvement in his anxiety He was diagnosed with prostate ca in april and s/p RT. Last PSA was ~5 and following with urology Occasionally experiences overnight stiffness  Nonmotor sleeping well with remeron. of note 30mg of remeron caused more insomnia compared to 15mg +Constipation, BM every other day, Milk of magnesia, miralax. Last saw GI 2 yrs ago denies hallucinations  Meds sinemet  2 tabs every 3 and half hour (5 doses/d) rytary 145 one capsule 5x per day duloxetine 20mg hs remeron 15mg hs ambien 5mg qhs prn finasteride 5mg protonix  Prior  melatonin selegiline. rasagiline not covered by insurance  neupro- not covered by insurance rytary : headache

## 2024-08-06 NOTE — DISCUSSION/SUMMARY
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 Take extar tab overnight prn - trial of nourianz 20mg qd to see if afternoon off periods respond - add senna hs and cont miralax qd - cont remeron 15mg hs for insomnia - cont duloxetine 20mg per psych - f/u urology for prostate cancer mgmt  -Follow-up in 3 months

## 2024-08-06 NOTE — PHYSICAL EXAM
[FreeTextEntry1] : Last LD dose 2hrs There is mild masking. EOMI. Speech is 2+. Tremor is absent. There is 2+ R>L bradykinesia with trace cogwheeling. Tremor absent. Has right arm and leg dystonia when walking. Pull test negative. During visit, LD further kicks in with normalization of right side dystonia

## 2024-08-22 ENCOUNTER — APPOINTMENT (OUTPATIENT)
Dept: CV DIAGNOSITCS | Facility: HOSPITAL | Age: 54
End: 2024-08-22

## 2024-09-04 ENCOUNTER — APPOINTMENT (OUTPATIENT)
Dept: RADIATION ONCOLOGY | Facility: CLINIC | Age: 54
End: 2024-09-04

## 2024-09-04 VITALS — HEIGHT: 64 IN | BODY MASS INDEX: 24.59 KG/M2 | WEIGHT: 144 LBS | RESPIRATION RATE: 18 BRPM

## 2024-09-04 PROCEDURE — 99214 OFFICE O/P EST MOD 30 MIN: CPT

## 2024-09-04 NOTE — REVIEW OF SYSTEMS
[Negative] : Allergic/Immunologic [FreeTextEntry5] : FARAZ [FreeTextEntry7] : GERD [de-identified] : Parkinson disease [Anal Pain: Grade 0] : Anal Pain: Grade 0 [Constipation: Grade 0] : Constipation: Grade 0 [Diarrhea: Grade 0] : Diarrhea: Grade 0 [Dyspepsia: Grade 0] : Dyspepsia: Grade 0 [Dysphagia: Grade 0] : Dysphagia: Grade 0 [Esophagitis: Grade 0] : Esophagitis: Grade 0 [Fecal Incontinence: Grade 0] : Fecal Incontinence: Grade 0 [Gastroparesis: Grade 0] : Gastroparesis: Grade 0 [Nausea: Grade 0] : Nausea: Grade 0 [Proctitis: Grade 0] : Proctitis: Grade 0 [Rectal Pain: Grade 0] : Rectal Pain: Grade 0 [Small Intestinal Obstruction: Grade 0] : Small Intestinal Obstruction: Grade 0 [Vomiting: Grade 0] : Vomiting: Grade 0 [Hematuria: Grade 0] : Hematuria: Grade 0 [Urinary Incontinence: Grade 0] : Urinary Incontinence: Grade 0  [Urinary Retention: Grade 1 - Urinary, suprapubic or intermittent catheter placement not indicated; able to void with some residual] : Urinary Retention: Grade 1 - Urinary, suprapubic or intermittent catheter placement not indicated; able to void with some residual [Urinary Tract Pain: Grade 0] : Urinary Tract Pain: Grade 0 [Urinary Urgency: Grade 0] : Urinary Urgency: Grade 0 [Urinary Frequency: Grade 0] : Urinary Frequency: Grade 0 [Erectile Dysfunction: Grade 2 - Decrease in erectile function (frequency/rigidity of erections), erectile intervention indicated, (e.g., medication or mechanical devices such as penile pump)] : Erectile Dysfunction: Grade 2 - Decrease in erectile function (frequency/rigidity of erections), erectile intervention indicated, (e.g., medication or mechanical devices such as penile pump)

## 2024-09-04 NOTE — DISEASE MANAGEMENT
[Biopsy with Fusion] : Patient had a biopsy with fusion on [7(3+4)] : Template Biopsy Cameron Score: 7(3+4) [4] : 4 [BiopsyDate] : 11/2023 [MeasuredProstateVolume] : 34 [TotalCores] : 13 [TotalPositiveCores] : 2 [MaxCoreInvolvement] : 50 [Clinical] : TNM Stage: c [TTNM] : x [NTNM] : x [MTNM] : x [II] : II

## 2024-09-04 NOTE — HISTORY OF PRESENT ILLNESS
[FreeTextEntry1] : 54 y/o male with Parkinson disease, GERD, HLD, h/o radiation for [place] 3+4 prostate adenocarcinoma in 2/2024 presents for f/u  Initially Pt presented with hematuria at visit with PCP in 10/2023. 10/2023 -- MRI showed prostate vol 34 cc. No SVI, No LAD. PI- RADS 4 11/2023 -- PSA 4.72 11/15/2023 -- biopsy showed prostate adenocarcinoma. Gaurav 3+4 in 1 core, 50% of tissue involved. [place] 3+3 in 1 core , 5% involvement. Total 13 cores biopsied.  12/6/2023 Consultation. Nocturia 2-3 times. No hematuria, abd pain, fever.   2/22/2024 OTV. 28/28 fractions of radiation to prostate completed. c/o burning sensation during urination and urinary hesitancy, on flomax QHS for now. Also c/o mild erectile dysfunction.  No abd pain, fever, hematuria. Bowel movement is okay. Pt saw neurologist for Parkinson disease. 4/2024 -- PSA 4.64 7/2024 -- PSA 5.28  9/4/2024 F/U. Pt completed 70 Gy / 28 Fx of radiation to prostate on 2/22/2024. On flomax. Requested viagra for ED. Rx given last visit. Will see urologist again in 10/2024.

## 2024-09-20 NOTE — ED ADULT NURSE NOTE - BIRTH SEX
----- Message from Kanwal Chase sent at 9/20/2024  8:15 AM CDT -----  Type : Patient Call      Who Called : Claudia ([facility])      Does the patient know what this is regarding?: Following up on request for orders for Respite Care  with supporting documentation to be faxed over to [facility]; claudia says they've called in multiple times ; Called in on 9/10 and 9/16 ; pts daughter called in about the request as well; please advise         Would the patient rather a call back or a response via [facility]? Call        Best Call Back Number: [phone] - Claudia Direct        Additional Information:  Fax Number - [phone] or use [facility] provider raymond   Male

## 2024-10-23 ENCOUNTER — APPOINTMENT (OUTPATIENT)
Dept: UROLOGY | Facility: CLINIC | Age: 54
End: 2024-10-23

## 2024-11-12 ENCOUNTER — APPOINTMENT (OUTPATIENT)
Dept: RADIATION ONCOLOGY | Facility: CLINIC | Age: 54
End: 2024-11-12
Payer: MEDICAID

## 2024-11-12 PROCEDURE — 99214 OFFICE O/P EST MOD 30 MIN: CPT

## 2024-11-26 ENCOUNTER — APPOINTMENT (OUTPATIENT)
Dept: UROLOGY | Facility: CLINIC | Age: 54
End: 2024-11-26
Payer: MEDICAID

## 2024-11-26 VITALS
DIASTOLIC BLOOD PRESSURE: 70 MMHG | OXYGEN SATURATION: 98 % | HEIGHT: 64 IN | BODY MASS INDEX: 24.24 KG/M2 | SYSTOLIC BLOOD PRESSURE: 110 MMHG | TEMPERATURE: 97 F | WEIGHT: 142 LBS | HEART RATE: 82 BPM

## 2024-11-26 DIAGNOSIS — R97.20 ELEVATED PROSTATE, SPECIFIC ANTIGEN [PSA]: ICD-10-CM

## 2024-11-26 PROCEDURE — G2211 COMPLEX E/M VISIT ADD ON: CPT | Mod: NC

## 2024-11-26 PROCEDURE — 99214 OFFICE O/P EST MOD 30 MIN: CPT

## 2024-11-27 ENCOUNTER — OUTPATIENT (OUTPATIENT)
Dept: OUTPATIENT SERVICES | Facility: HOSPITAL | Age: 54
LOS: 1 days | End: 2024-11-27
Payer: MEDICAID

## 2024-11-27 ENCOUNTER — APPOINTMENT (OUTPATIENT)
Dept: ULTRASOUND IMAGING | Facility: IMAGING CENTER | Age: 54
End: 2024-11-27
Payer: MEDICAID

## 2024-11-27 ENCOUNTER — RESULT REVIEW (OUTPATIENT)
Age: 54
End: 2024-11-27

## 2024-11-27 DIAGNOSIS — Z00.8 ENCOUNTER FOR OTHER GENERAL EXAMINATION: ICD-10-CM

## 2024-11-27 DIAGNOSIS — C61 MALIGNANT NEOPLASM OF PROSTATE: ICD-10-CM

## 2024-11-27 LAB — PSA SERPL-MCNC: 4.54 NG/ML

## 2024-11-27 PROCEDURE — 88173 CYTOPATH EVAL FNA REPORT: CPT

## 2024-11-27 PROCEDURE — 88172 CYTP DX EVAL FNA 1ST EA SITE: CPT

## 2024-11-27 PROCEDURE — 88173 CYTOPATH EVAL FNA REPORT: CPT | Mod: 26

## 2024-11-27 PROCEDURE — 10005 FNA BX W/US GDN 1ST LES: CPT

## 2024-12-03 ENCOUNTER — APPOINTMENT (OUTPATIENT)
Dept: RADIATION ONCOLOGY | Facility: CLINIC | Age: 54
End: 2024-12-03

## 2024-12-19 ENCOUNTER — APPOINTMENT (OUTPATIENT)
Dept: NEUROLOGY | Facility: CLINIC | Age: 54
End: 2024-12-19

## 2025-01-07 ENCOUNTER — APPOINTMENT (OUTPATIENT)
Dept: NEUROLOGY | Facility: CLINIC | Age: 55
End: 2025-01-07
Payer: COMMERCIAL

## 2025-01-07 VITALS
BODY MASS INDEX: 24.37 KG/M2 | WEIGHT: 142 LBS | SYSTOLIC BLOOD PRESSURE: 116 MMHG | DIASTOLIC BLOOD PRESSURE: 67 MMHG | HEART RATE: 77 BPM

## 2025-01-07 PROCEDURE — 99214 OFFICE O/P EST MOD 30 MIN: CPT

## 2025-01-07 RX ORDER — LEVODOPA 42 MG/1
42 CAPSULE RESPIRATORY (INHALATION)
Qty: 180 | Refills: 1 | Status: ACTIVE | COMMUNITY
Start: 2025-01-07 | End: 1900-01-01

## 2025-01-07 RX ORDER — AMANTADINE HYDROCHLORIDE 100 MG/1
100 CAPSULE ORAL
Qty: 180 | Refills: 1 | Status: DISCONTINUED | COMMUNITY
Start: 2025-01-07 | End: 2025-01-07

## 2025-02-14 NOTE — ED ADULT NURSE NOTE - NSICDXFAMILYHX_GEN_ALL_CORE_FT
FAMILY HISTORY:  Father  Still living? Unknown  Family history of diabetes mellitus type II, Age at diagnosis: Age Unknown    
Withheld/Decline to Answer

## 2025-02-26 ENCOUNTER — APPOINTMENT (OUTPATIENT)
Dept: UROLOGY | Facility: CLINIC | Age: 55
End: 2025-02-26

## 2025-04-14 ENCOUNTER — APPOINTMENT (OUTPATIENT)
Dept: NEUROLOGY | Facility: CLINIC | Age: 55
End: 2025-04-14

## 2025-08-01 NOTE — ED PROVIDER NOTE - CADM POA PRESS ULCER
No Anesthesia Type: 1% lidocaine with epinephrine and a 1:10 solution of 8.4% sodium bicarbonate Medical Necessity Clause: This procedure was medically necessary because the lesions that were treated were: Medical Necessity Information: It is in your best interest to select a reason for this procedure from the list below. All of these items fulfill various CMS LCD requirements except the new and changing color options. Anesthesia Volume In Cc: 0.5 Detail Level: Detailed Hemostasis: Aluminum Chloride Consent: Written consent obtained and the risks of skin tag removal was reviewed with the patient including but not limited to bleeding, pigmentary change, infection, pain, and remote possibility of scarring. Add Associated Diagnoses If Applicable When Selecting Medical Necessity: Yes Include Z78.9 (Other Specified Conditions Influencing Health Status) As An Associated Diagnosis?: No

## 2025-08-12 ENCOUNTER — APPOINTMENT (OUTPATIENT)
Dept: NEUROLOGY | Facility: CLINIC | Age: 55
End: 2025-08-12
Payer: MEDICAID

## 2025-08-12 VITALS
DIASTOLIC BLOOD PRESSURE: 64 MMHG | BODY MASS INDEX: 22.53 KG/M2 | SYSTOLIC BLOOD PRESSURE: 99 MMHG | HEART RATE: 72 BPM | WEIGHT: 132 LBS | HEIGHT: 64 IN

## 2025-08-12 PROCEDURE — G2211 COMPLEX E/M VISIT ADD ON: CPT | Mod: NC

## 2025-08-12 PROCEDURE — 99214 OFFICE O/P EST MOD 30 MIN: CPT

## 2025-08-12 RX ORDER — MIRTAZAPINE 7.5 MG/1
7.5 TABLET, FILM COATED ORAL
Qty: 180 | Refills: 0 | Status: ACTIVE | COMMUNITY
Start: 2025-08-12 | End: 1900-01-01

## 2025-09-10 ENCOUNTER — APPOINTMENT (OUTPATIENT)
Dept: UROLOGY | Facility: CLINIC | Age: 55
End: 2025-09-10
Payer: MEDICAID

## 2025-09-10 VITALS
WEIGHT: 132 LBS | TEMPERATURE: 97 F | HEART RATE: 77 BPM | SYSTOLIC BLOOD PRESSURE: 106 MMHG | HEIGHT: 64 IN | OXYGEN SATURATION: 97 % | DIASTOLIC BLOOD PRESSURE: 71 MMHG | BODY MASS INDEX: 22.53 KG/M2

## 2025-09-10 DIAGNOSIS — C61 MALIGNANT NEOPLASM OF PROSTATE: ICD-10-CM

## 2025-09-10 DIAGNOSIS — R97.20 ELEVATED PROSTATE, SPECIFIC ANTIGEN [PSA]: ICD-10-CM

## 2025-09-10 DIAGNOSIS — N52.9 MALE ERECTILE DYSFUNCTION, UNSPECIFIED: ICD-10-CM

## 2025-09-10 DIAGNOSIS — N13.8 BENIGN PROSTATIC HYPERPLASIA WITH LOWER URINARY TRACT SYMPMS: ICD-10-CM

## 2025-09-10 DIAGNOSIS — N40.1 BENIGN PROSTATIC HYPERPLASIA WITH LOWER URINARY TRACT SYMPMS: ICD-10-CM

## 2025-09-10 PROCEDURE — 99214 OFFICE O/P EST MOD 30 MIN: CPT

## 2025-09-10 PROCEDURE — G2211 COMPLEX E/M VISIT ADD ON: CPT | Mod: NC

## 2025-09-10 RX ORDER — TADALAFIL 10 MG/1
10 TABLET, FILM COATED ORAL
Qty: 10 | Refills: 0 | Status: ACTIVE | COMMUNITY
Start: 2025-09-10 | End: 1900-01-01

## 2025-09-17 ENCOUNTER — APPOINTMENT (OUTPATIENT)
Dept: UROLOGY | Facility: CLINIC | Age: 55
End: 2025-09-17